# Patient Record
Sex: MALE | Race: WHITE | NOT HISPANIC OR LATINO | Employment: UNEMPLOYED | URBAN - NONMETROPOLITAN AREA
[De-identification: names, ages, dates, MRNs, and addresses within clinical notes are randomized per-mention and may not be internally consistent; named-entity substitution may affect disease eponyms.]

---

## 2019-06-25 ENCOUNTER — OFFICE VISIT (OUTPATIENT)
Dept: PULMONOLOGY | Facility: CLINIC | Age: 39
End: 2019-06-25

## 2019-06-25 VITALS
SYSTOLIC BLOOD PRESSURE: 130 MMHG | OXYGEN SATURATION: 98 % | HEIGHT: 71 IN | BODY MASS INDEX: 33.74 KG/M2 | HEART RATE: 79 BPM | DIASTOLIC BLOOD PRESSURE: 90 MMHG | TEMPERATURE: 98 F | WEIGHT: 241 LBS

## 2019-06-25 DIAGNOSIS — J44.9 CHRONIC OBSTRUCTIVE PULMONARY DISEASE, UNSPECIFIED COPD TYPE (HCC): Primary | ICD-10-CM

## 2019-06-25 LAB
FEV1: 3.59 LITERS
FVC VOL RESPIRATORY: 4.32 LITERS

## 2019-06-25 PROCEDURE — 99202 OFFICE O/P NEW SF 15 MIN: CPT | Performed by: INTERNAL MEDICINE

## 2019-06-25 PROCEDURE — 94010 BREATHING CAPACITY TEST: CPT | Performed by: INTERNAL MEDICINE

## 2019-06-25 ASSESSMENT — PULMONARY FUNCTION TESTS
FVC: 4.32
FEV1: 3.59

## 2019-06-25 NOTE — PROGRESS NOTES
Subjective   No chief complaint on file.      Lan Zheng is a 38 y.o. male     History of Present Illness 38-year-old male referred for evaluation of COPD in spite of his young age she has smoked about 20 years quit 2007 and has recurrent bouts of bronchitis with history of asthma as a child and a father with COPD.  He is  and has 2 kids no one has asthma his wife is a non-smoker and he has been a  currently not working.  He takes Symbicort twice a day and Ventolin inhaler several times a day because of shortness of breath.  Took Trelegy for a while that seem to controlling him better than Symbicort and Ventolin that he is currently taking    Review of Systems recurrent episodes of cough chest infection requiring emergency room visits some shortness of breath on exertion not much wheezing and review of systems noncontributory    Family History   Problem Relation Age of Onset   • Heart failure Father    • Hypertension Father        No past medical history on file.    Past Surgical History:   Procedure Laterality Date   • EYE SURGERY         Social History     Socioeconomic History   • Marital status:      Spouse name: Not on file   • Number of children: Not on file   • Years of education: Not on file   • Highest education level: Not on file   Tobacco Use   • Smoking status: Former Smoker   • Smokeless tobacco: Never Used   Substance and Sexual Activity   • Alcohol use: No     Frequency: Never   • Drug use: No        Physical Exam: No acute distress slightly overweight lungs clear heart regular no clubbing cyanosis or edema    PFT: FVC 99 FEV1 101%    Imaging: Chest x-ray portable from [facility] dated March 17 clear no cardiomegaly    Other Labs:       ASSESSMENT COPD question of asthmatic component        Recommendations: Continue Symbicort and albuterol inhaler that Incruse or drug Spiriva if they deny Trelegy that was prescribed again.  The prescription for Z-Edwin to take his in case  of recurrent bronchitis    Follow up: Return in 4 weeks with a PFT in case of variation and PFT and may singular to his right

## 2019-08-07 ENCOUNTER — OFFICE VISIT (OUTPATIENT)
Dept: PULMONOLOGY | Facility: CLINIC | Age: 39
End: 2019-08-07

## 2019-08-07 VITALS
SYSTOLIC BLOOD PRESSURE: 148 MMHG | TEMPERATURE: 98 F | WEIGHT: 244 LBS | OXYGEN SATURATION: 98 % | HEART RATE: 79 BPM | BODY MASS INDEX: 34.16 KG/M2 | HEIGHT: 71 IN | DIASTOLIC BLOOD PRESSURE: 91 MMHG

## 2019-08-07 DIAGNOSIS — J44.9 CHRONIC OBSTRUCTIVE PULMONARY DISEASE, UNSPECIFIED COPD TYPE (HCC): Primary | ICD-10-CM

## 2019-08-07 LAB
FEV1: 1.77 LITERS
FVC VOL RESPIRATORY: 4.35 LITERS

## 2019-08-07 PROCEDURE — 99212 OFFICE O/P EST SF 10 MIN: CPT | Performed by: INTERNAL MEDICINE

## 2019-08-07 PROCEDURE — 94010 BREATHING CAPACITY TEST: CPT | Performed by: INTERNAL MEDICINE

## 2019-08-07 ASSESSMENT — PULMONARY FUNCTION TESTS
FEV1: 1.77
FVC: 4.35

## 2019-08-07 NOTE — PROGRESS NOTES
Subjective   Chief Complaint   Patient presents with   • COPD       Lan Zheng is a 39 y.o. male     History of Present Illness 39-year-old gentleman seen 6/25/2019 for what seemed to be mild COPD with possible asthmatic component his history was significant for smoking up to 1/2 packs a day for 20 years however he quit 10 years ago his PFT was normal however was given Incruse singular Symbicort and albuterol inhaler he comes in feeling much better since last visit states that he has good days and bad days    Review of Systems minimal cough some shortness of breath occasional wheezing with overall feeling better since last visit    Family History   Problem Relation Age of Onset   • Heart failure Father    • Hypertension Father        History reviewed. No pertinent past medical history.    Past Surgical History:   Procedure Laterality Date   • EYE SURGERY         Social History     Socioeconomic History   • Marital status:      Spouse name: Not on file   • Number of children: Not on file   • Years of education: Not on file   • Highest education level: Not on file   Tobacco Use   • Smoking status: Former Smoker   • Smokeless tobacco: Never Used   Substance and Sexual Activity   • Alcohol use: No     Frequency: Never   • Drug use: No        Physical Exam: No acute distress vital signs are stable lungs are clear heart regular    PFT: PFT much worse today FVC of 84 FEV1 43% is 199 and 100% June 26    Imaging:    Other Labs:       ASSESSMENT COPD with asthmatic component        Recommendations: Continue current medications use albuterol inhaler for rescue    Follow up: Return in 3 months that is November 12 with a PFT or earlier as needed

## 2019-11-12 ENCOUNTER — OFFICE VISIT (OUTPATIENT)
Dept: PULMONOLOGY | Facility: CLINIC | Age: 39
End: 2019-11-12

## 2019-11-12 VITALS
WEIGHT: 244 LBS | DIASTOLIC BLOOD PRESSURE: 93 MMHG | HEART RATE: 75 BPM | HEIGHT: 71 IN | TEMPERATURE: 98 F | SYSTOLIC BLOOD PRESSURE: 162 MMHG | BODY MASS INDEX: 34.16 KG/M2 | OXYGEN SATURATION: 98 %

## 2019-11-12 DIAGNOSIS — J44.9 CHRONIC OBSTRUCTIVE PULMONARY DISEASE, UNSPECIFIED COPD TYPE (HCC): Primary | ICD-10-CM

## 2019-11-12 PROCEDURE — 94010 BREATHING CAPACITY TEST: CPT | Performed by: INTERNAL MEDICINE

## 2019-11-12 PROCEDURE — 99212 OFFICE O/P EST SF 10 MIN: CPT | Performed by: INTERNAL MEDICINE

## 2019-12-10 ENCOUNTER — OFFICE VISIT (OUTPATIENT)
Dept: PULMONOLOGY | Facility: CLINIC | Age: 39
End: 2019-12-10

## 2019-12-10 VITALS
OXYGEN SATURATION: 96 % | BODY MASS INDEX: 35 KG/M2 | DIASTOLIC BLOOD PRESSURE: 85 MMHG | SYSTOLIC BLOOD PRESSURE: 159 MMHG | TEMPERATURE: 98 F | HEART RATE: 85 BPM | WEIGHT: 250 LBS | HEIGHT: 71 IN

## 2019-12-10 DIAGNOSIS — J44.9 CHRONIC OBSTRUCTIVE PULMONARY DISEASE, UNSPECIFIED COPD TYPE (HCC): Primary | ICD-10-CM

## 2019-12-10 PROCEDURE — 94010 BREATHING CAPACITY TEST: CPT | Performed by: INTERNAL MEDICINE

## 2019-12-10 PROCEDURE — 99212 OFFICE O/P EST SF 10 MIN: CPT | Performed by: INTERNAL MEDICINE

## 2019-12-10 RX ORDER — IPRATROPIUM BROMIDE AND ALBUTEROL SULFATE 2.5; .5 MG/3ML; MG/3ML
SOLUTION RESPIRATORY (INHALATION)
COMMUNITY
Start: 2019-10-24 | End: 2020-11-25 | Stop reason: SDUPTHER

## 2019-12-10 RX ORDER — THEOPHYLLINE 300 MG/1
TABLET, EXTENDED RELEASE ORAL
COMMUNITY
Start: 2019-11-12 | End: 2020-02-27 | Stop reason: SDUPTHER

## 2019-12-10 RX ORDER — MONTELUKAST SODIUM 10 MG/1
TABLET ORAL
COMMUNITY
Start: 2019-11-21 | End: 2020-06-23

## 2019-12-10 RX ORDER — LORATADINE 10 MG/1
TABLET ORAL
COMMUNITY
Start: 2019-11-21 | End: 2021-11-02 | Stop reason: SDUPTHER

## 2019-12-10 RX ORDER — PREDNISONE 10 MG/1
TABLET ORAL
COMMUNITY
Start: 2019-11-12 | End: 2020-06-23

## 2019-12-10 RX ORDER — AZITHROMYCIN 250 MG/1
TABLET, FILM COATED ORAL
COMMUNITY
Start: 2019-11-27 | End: 2020-11-25

## 2019-12-10 NOTE — PROGRESS NOTES
Subjective   Chief Complaint   Patient presents with   • COPD       Lan Zheng is a 39 y.o. male     History of Present Illness 39-year-old male returning for follow-up of COPD with asthmatic component and variable airway obstruction is first office visit with us was June 25 his PFT was normal at that time however follow-up visits his PFT showed this very severe T of obstructive impairment in spite of feeling better his current medications his last office visit with us was December 3 and he was placed on a tapering dose of prednisone and increase his theophylline to 1200mg a day his other medications includes singular Breo incruse and he has been using nebulizer that he lately does not need to use    Review of Systems feeling better less shortness of breath not much cough or expectoration continues to have shortness of breath on exertion and occasional wheezing    Family History   Problem Relation Age of Onset   • Heart failure Father    • Hypertension Father        History reviewed. No pertinent past medical history.    Past Surgical History:   Procedure Laterality Date   • EYE SURGERY         Social History     Socioeconomic History   • Marital status:      Spouse name: Not on file   • Number of children: Not on file   • Years of education: Not on file   • Highest education level: Not on file   Tobacco Use   • Smoking status: Former Smoker   • Smokeless tobacco: Never Used   Substance and Sexual Activity   • Alcohol use: No     Frequency: Never   • Drug use: No        Physical Exam: No acute distress today lungs clear heart regular no clubbing cyanosis or edema    PFT: PFT worse today question of full effort however his FVC 72 FEV1 27% December 3 they were 65 and 35% and first visit of June 25 they were 99 and 101%    Imaging:    Other Labs:       ASSESSMENT COPD with asthmatic component        Recommendations: Renewed all the medications including prednisone 10 mg every other day    Follow up: Return  February 28 with a PFT

## 2020-02-26 ENCOUNTER — OFFICE VISIT (OUTPATIENT)
Dept: PULMONOLOGY | Facility: CLINIC | Age: 40
End: 2020-02-26

## 2020-02-26 VITALS
BODY MASS INDEX: 34.16 KG/M2 | HEART RATE: 100 BPM | OXYGEN SATURATION: 96 % | SYSTOLIC BLOOD PRESSURE: 146 MMHG | WEIGHT: 244 LBS | HEIGHT: 71 IN | TEMPERATURE: 97.8 F | DIASTOLIC BLOOD PRESSURE: 90 MMHG

## 2020-02-26 DIAGNOSIS — J44.9 CHRONIC OBSTRUCTIVE PULMONARY DISEASE, UNSPECIFIED COPD TYPE (HCC): Primary | ICD-10-CM

## 2020-02-26 PROCEDURE — 99212 OFFICE O/P EST SF 10 MIN: CPT | Performed by: INTERNAL MEDICINE

## 2020-02-26 PROCEDURE — 94010 BREATHING CAPACITY TEST: CPT | Performed by: INTERNAL MEDICINE

## 2020-02-26 NOTE — PROGRESS NOTES
Subjective   Chief Complaint   Patient presents with   • COPD       Lan Zheng is a 39 y.o. male     History of Present Illness 39-year-old male with COPD and asthmatic component that has been disabled because of difficulty breathing however currently is doing okay with Incruse and Breo theophylline 600 mg once a day and he rarely needs to use the inhaler he had an episode of bronchitis for which was seen by local MD and was given some antibiotic for 2 weeks.  Had asthma when he was a child and quit smoking 5 years ago    Review of Systems intermittent cough and some expectoration shortness of breath and occasional wheezing    Family History   Problem Relation Age of Onset   • Heart failure Father    • Hypertension Father        History reviewed. No pertinent past medical history.    Past Surgical History:   Procedure Laterality Date   • EYE SURGERY         Social History     Socioeconomic History   • Marital status:      Spouse name: Not on file   • Number of children: Not on file   • Years of education: Not on file   • Highest education level: Not on file   Tobacco Use   • Smoking status: Former Smoker   • Smokeless tobacco: Never Used   Substance and Sexual Activity   • Alcohol use: No     Frequency: Never   • Drug use: No   • Sexual activity: Defer        Physical Exam: Cute distress vital signs are stable minimal expiratory rhonchi heart regular no clubbing cyanosis or edema    PFT: FVC 77 FEV1 65% mild obstructive impairment much improved since December that the numbers were 72 and 27%    Imaging:    Other Labs:       ASSESSMENT COPD with asthmatic component seemingly under control        Recommendations: Not all the medications added a prescription for Z-Edwin to take as needed for acute bronchitis encouraged him to try to work as his PFT seems to be much improved    Follow up: Return in 2 months with PFT    PFT's was recorded has been ordered and interpreted as above

## 2020-02-27 DIAGNOSIS — J44.9 CHRONIC OBSTRUCTIVE PULMONARY DISEASE, UNSPECIFIED COPD TYPE (HCC): Primary | ICD-10-CM

## 2020-02-27 RX ORDER — THEOPHYLLINE 300 MG/1
300 TABLET, EXTENDED RELEASE ORAL DAILY
Qty: 30 TABLET | Refills: 2 | Status: SHIPPED | OUTPATIENT
Start: 2020-02-27 | End: 2020-07-22 | Stop reason: SDUPTHER

## 2020-02-27 RX ORDER — ALBUTEROL SULFATE 90 UG/1
2 AEROSOL, METERED RESPIRATORY (INHALATION) EVERY 4 HOURS PRN
Qty: 18 G | Refills: 11 | Status: SHIPPED | OUTPATIENT
Start: 2020-02-27 | End: 2020-07-22 | Stop reason: SDUPTHER

## 2020-03-04 DIAGNOSIS — J44.9 CHRONIC OBSTRUCTIVE PULMONARY DISEASE, UNSPECIFIED COPD TYPE (HCC): ICD-10-CM

## 2020-06-23 RX ORDER — MONTELUKAST SODIUM 10 MG/1
TABLET ORAL
Qty: 30 TABLET | Refills: 0 | Status: SHIPPED | OUTPATIENT
Start: 2020-06-23 | End: 2020-07-17

## 2020-06-23 RX ORDER — PREDNISONE 10 MG/1
TABLET ORAL
Qty: 15 TABLET | Refills: 0 | Status: SHIPPED | OUTPATIENT
Start: 2020-06-23 | End: 2020-07-17

## 2020-07-17 RX ORDER — MONTELUKAST SODIUM 10 MG/1
TABLET ORAL
Qty: 30 TABLET | Refills: 0 | Status: SHIPPED | OUTPATIENT
Start: 2020-07-17 | End: 2020-07-22 | Stop reason: SDUPTHER

## 2020-07-17 RX ORDER — PREDNISONE 10 MG/1
TABLET ORAL
Qty: 15 TABLET | Refills: 0 | Status: SHIPPED | OUTPATIENT
Start: 2020-07-17 | End: 2020-07-22 | Stop reason: SDUPTHER

## 2020-07-22 ENCOUNTER — OFFICE VISIT (OUTPATIENT)
Dept: PULMONOLOGY | Facility: CLINIC | Age: 40
End: 2020-07-22

## 2020-07-22 DIAGNOSIS — J44.9 CHRONIC OBSTRUCTIVE PULMONARY DISEASE, UNSPECIFIED COPD TYPE (HCC): Primary | ICD-10-CM

## 2020-07-22 DIAGNOSIS — J44.9 CHRONIC OBSTRUCTIVE PULMONARY DISEASE, UNSPECIFIED COPD TYPE (HCC): ICD-10-CM

## 2020-07-22 PROCEDURE — 99441 PR PHYS/QHP TELEPHONE EVALUATION 5-10 MIN: CPT | Performed by: INTERNAL MEDICINE

## 2020-07-22 RX ORDER — MONTELUKAST SODIUM 10 MG/1
10 TABLET ORAL
Qty: 30 TABLET | Refills: 0 | Status: SHIPPED | OUTPATIENT
Start: 2020-07-22 | End: 2021-04-29 | Stop reason: SDUPTHER

## 2020-07-22 RX ORDER — PREDNISONE 10 MG/1
10 TABLET ORAL DAILY
Qty: 30 TABLET | Refills: 5 | Status: SHIPPED | OUTPATIENT
Start: 2020-07-22 | End: 2020-11-25 | Stop reason: SDUPTHER

## 2020-07-22 RX ORDER — DOXYCYCLINE HYCLATE 100 MG/1
CAPSULE ORAL
COMMUNITY
Start: 2020-04-23 | End: 2020-11-25

## 2020-07-22 RX ORDER — FLUTICASONE PROPIONATE 50 MCG
1 SPRAY, SUSPENSION (ML) NASAL DAILY
COMMUNITY
Start: 2020-05-26

## 2020-07-22 RX ORDER — THEOPHYLLINE 300 MG/1
300 TABLET, EXTENDED RELEASE ORAL DAILY
Qty: 30 TABLET | Refills: 2 | Status: SHIPPED | OUTPATIENT
Start: 2020-07-22 | End: 2021-04-29 | Stop reason: SDUPTHER

## 2020-07-22 RX ORDER — PREDNISONE 10 MG/1
10 TABLET ORAL DAILY
Qty: 15 TABLET | Refills: 0 | Status: SHIPPED | OUTPATIENT
Start: 2020-07-22 | End: 2020-11-25 | Stop reason: SDUPTHER

## 2020-07-22 RX ORDER — ALBUTEROL SULFATE 90 UG/1
2 AEROSOL, METERED RESPIRATORY (INHALATION) EVERY 4 HOURS PRN
Qty: 18 G | Refills: 11 | Status: SHIPPED | OUTPATIENT
Start: 2020-07-22 | End: 2020-11-25 | Stop reason: SDUPTHER

## 2020-07-22 RX ORDER — AZITHROMYCIN 250 MG/1
TABLET, FILM COATED ORAL
Qty: 6 TABLET | Refills: 0 | Status: SHIPPED | OUTPATIENT
Start: 2020-07-22 | End: 2020-11-25

## 2020-07-22 RX ORDER — THEOPHYLLINE 600 MG/1
600 TABLET, EXTENDED RELEASE ORAL DAILY
Qty: 30 TABLET | Refills: 5 | Status: SHIPPED | OUTPATIENT
Start: 2020-07-22 | End: 2020-08-21

## 2020-07-22 RX ORDER — AZITHROMYCIN 250 MG/1
TABLET, FILM COATED ORAL
Status: CANCELLED | OUTPATIENT
Start: 2020-07-22

## 2020-07-22 NOTE — PROGRESS NOTES
Subjective   Chief Complaint   Patient presents with   • COPD       Lan Zheng is a 40 y.o. male this is sister gave consent for telephone conversation called him Wednesday morning of July 22 and talk to him on the phone for 10 minutes    History of Present Illness 30-year-old male with history of COPD with asthmatic component last time seen in the office March 4 with history of childhood asthma some smoking until 10 years ago and being disabled because of her breathing problem and taking and Breo Incruse theophylline and prednisone 10 mg every other day and Z-Edwin as needed    Review of Systems daily cough intermittent expectoration shortness of breath and occasional wheezing feels that needs to take more prednisone to control his symptoms    Family History   Problem Relation Age of Onset   • Heart failure Father    • Hypertension Father        History reviewed. No pertinent past medical history.    Past Surgical History:   Procedure Laterality Date   • EYE SURGERY         Social History     Socioeconomic History   • Marital status:      Spouse name: Not on file   • Number of children: Not on file   • Years of education: Not on file   • Highest education level: Not on file   Tobacco Use   • Smoking status: Former Smoker   • Smokeless tobacco: Never Used   Substance and Sexual Activity   • Alcohol use: No     Frequency: Never   • Drug use: No   • Sexual activity: Defer        Physical Exam: No physical exam but did not seem to be any distress on the phone    PFT: PFT in the office March 4 showed FVC of 77 FEV1 65% indicating mild obstructive impairment    Imaging: Chest x-ray the office was clear    Other Labs:       ASSESSMENT COPD with asthmatic component        Recommendations: Current medications may increase prednisone from 10 mg every other day to 20 mg every other day and try to taper instructed in the to use the lowest dose every other day to prevent complications    Follow up: Follow-up office in 3  months with PFT call earlier as needed  Talk to patient on phone for 10 minutes and seemed to understand all instructions

## 2020-11-25 ENCOUNTER — OFFICE VISIT (OUTPATIENT)
Dept: PULMONOLOGY | Facility: CLINIC | Age: 40
End: 2020-11-25

## 2020-11-25 VITALS
TEMPERATURE: 98.6 F | BODY MASS INDEX: 32.2 KG/M2 | SYSTOLIC BLOOD PRESSURE: 140 MMHG | DIASTOLIC BLOOD PRESSURE: 84 MMHG | HEART RATE: 73 BPM | OXYGEN SATURATION: 99 % | HEIGHT: 71 IN | WEIGHT: 230 LBS

## 2020-11-25 DIAGNOSIS — J45.41 MODERATE PERSISTENT ASTHMA WITH ACUTE EXACERBATION: Primary | ICD-10-CM

## 2020-11-25 DIAGNOSIS — J44.9 CHRONIC OBSTRUCTIVE PULMONARY DISEASE, UNSPECIFIED COPD TYPE (HCC): ICD-10-CM

## 2020-11-25 DIAGNOSIS — Z87.891 FORMER SMOKER: ICD-10-CM

## 2020-11-25 PROCEDURE — 99214 OFFICE O/P EST MOD 30 MIN: CPT | Performed by: PHYSICIAN ASSISTANT

## 2020-11-25 RX ORDER — PREDNISONE 10 MG/1
TABLET ORAL
Qty: 31 TABLET | Refills: 0 | Status: SHIPPED | OUTPATIENT
Start: 2020-11-25 | End: 2021-04-29

## 2020-11-25 RX ORDER — BUDESONIDE AND FORMOTEROL FUMARATE DIHYDRATE 160; 4.5 UG/1; UG/1
2 AEROSOL RESPIRATORY (INHALATION) 2 TIMES DAILY
Qty: 1 INHALER | Refills: 5 | Status: SHIPPED | OUTPATIENT
Start: 2020-11-25 | End: 2020-11-25 | Stop reason: SDUPTHER

## 2020-11-25 RX ORDER — IPRATROPIUM BROMIDE AND ALBUTEROL SULFATE 2.5; .5 MG/3ML; MG/3ML
3 SOLUTION RESPIRATORY (INHALATION)
Qty: 360 ML | Refills: 8 | Status: SHIPPED | OUTPATIENT
Start: 2020-11-25 | End: 2021-07-28 | Stop reason: SDUPTHER

## 2020-11-25 RX ORDER — ALBUTEROL SULFATE 90 UG/1
2 AEROSOL, METERED RESPIRATORY (INHALATION) EVERY 4 HOURS PRN
Qty: 18 G | Refills: 11 | Status: SHIPPED | OUTPATIENT
Start: 2020-11-25 | End: 2021-07-28 | Stop reason: SDUPTHER

## 2020-11-25 RX ORDER — PREDNISONE 10 MG/1
20 TABLET ORAL EVERY OTHER DAY
Qty: 30 TABLET | Refills: 5 | Status: SHIPPED | OUTPATIENT
Start: 2020-11-25 | End: 2020-12-25

## 2020-11-25 NOTE — PROGRESS NOTES
Have you had the Influenza Vaccine? yes    Would you like to receive this Vaccine today? no    Have you had the Pneumonia Vaccine?  no  Would you like to receive this Vaccine today? no    Are you a current smoker? no  Quit date? 13 years ago    Subjective    Lan Zheng presents for the following COPD      History of Present Illness   Patient presents today for follow up of COPD with concern of mixed asthmatic component, with history of childhood asthma, and brief cigarette dependence.   He is notable for intermittent but overall frequency of symptoms.  He has required frequent use of oral steroids over time and now requires 20 mg alternating daily for symptom maintenance.  He also requires 300 mg theophylline.  He states that he tried 600 mg daily in the past, but notes chest pain with use and improved with lowering the dose.  He has been on this for an extended period of time.  He currently continues with Breo and Incruse Ellipta at this time, but states that these are somewhat difficult to inhale and leave an oral residual.  He is notable for some acute wheezing today.    Review of Systems   Constitutional: Negative for activity change, fatigue and unexpected weight change.   HENT: Negative for congestion, postnasal drip and rhinorrhea.    Respiratory: Positive for cough, shortness of breath and wheezing. Negative for apnea and chest tightness.    Cardiovascular: Negative for chest pain and palpitations.   Gastrointestinal: Negative for nausea.   Allergic/Immunologic: Negative for environmental allergies.   Psychiatric/Behavioral: Negative for agitation and confusion.       Active Problems:  Problem List Items Addressed This Visit        Other    Former smoker      Other Visit Diagnoses     Moderate persistent asthma with acute exacerbation    -  Primary    Relevant Medications    Fluticasone-Umeclidin-Vilant (Trelegy Ellipta) 200-62.5-25 MCG/INH aerosol powder     albuterol sulfate  (90 Base) MCG/ACT  inhaler    ipratropium-albuterol (DUO-NEB) 0.5-2.5 mg/3 ml nebulizer    Other Relevant Orders    CBC & Differential    IgE Level    Chronic obstructive pulmonary disease, unspecified COPD type (CMS/[facility])        Relevant Medications    Fluticasone-Umeclidin-Vilant (Trelegy Ellipta) 200-62.5-25 MCG/INH aerosol powder     albuterol sulfate  (90 Base) MCG/ACT inhaler    ipratropium-albuterol (DUO-NEB) 0.5-2.5 mg/3 ml nebulizer    predniSONE (DELTASONE) 10 MG tablet    Other Relevant Orders    CBC & Differential    IgE Level          Past Medical History:  History reviewed. No pertinent past medical history.    Family History:  Family History   Problem Relation Age of Onset   • Heart failure Father    • Hypertension Father        Social History:  Social History     Tobacco Use   • Smoking status: Former Smoker   • Smokeless tobacco: Never Used   Substance Use Topics   • Alcohol use: No     Frequency: Never       Current Medications:  Current Outpatient Medications   Medication Sig Dispense Refill   • albuterol sulfate  (90 Base) MCG/ACT inhaler Inhale 2 puffs Every 4 (Four) Hours As Needed for Wheezing. 18 g 11   • fluticasone (FLONASE) 50 MCG/ACT nasal spray      • ipratropium-albuterol (DUO-NEB) 0.5-2.5 mg/3 ml nebulizer Take 3 mL by nebulization 4 (Four) Times a Day. 360 mL 8   • loratadine (CLARITIN) 10 MG tablet      • montelukast (SINGULAIR) 10 MG tablet Take 1 tablet by mouth every night at bedtime. 30 tablet 0   • predniSONE (DELTASONE) 10 MG tablet Take 1 tablet by mouth Daily. 15 tablet 0   • theophylline (THEODUR) 300 MG 12 hr tablet Take 1 tablet by mouth Daily. 30 tablet 2   • Umeclidinium Bromide (Incruse Ellipta) 62.5 MCG/INH aerosol powder  Inhale 1 puff Daily. 30 each 0   • azithromycin (ZITHROMAX) 250 MG tablet      • azithromycin (ZITHROMAX) 250 MG tablet Take 2 by mouth today then 1 daily for 4 days 6 tablet 0   • azithromycin (ZITHROMAX) 250 MG tablet Take 2 by mouth today then 1 daily  "for 4 days 6 tablet 0   • doxycycline (VIBRAMYCIN) 100 MG capsule      • Fluticasone Furoate-Vilanterol (Breo Ellipta) 200-25 MCG/INH inhaler Inhale 1 puff Daily. 1 inhaler 5   • Fluticasone-Umeclidin-Vilant (Trelegy Ellipta) 200-62.5-25 MCG/INH aerosol powder  Inhale 1 puff Daily. 1 each 8   • predniSONE (DELTASONE) 10 MG tablet Take 1 tablet by mouth Daily. 30 tablet 5   • predniSONE (DELTASONE) 10 MG tablet Take 4 tabs daily x 3 days, then take 3 tabs daily x 3 days, then take 2 tabs daily x 3 days, then take 1 tab daily x 3 days 31 tablet 0     No current facility-administered medications for this visit.        Allergies:  No Known Allergies    Vitals:  /84   Pulse 73   Temp 98.6 °F (37 °C) (Temporal)   Ht 180.3 cm (71\")   Wt 104 kg (230 lb)   SpO2 99%   BMI 32.08 kg/m²     Imaging:    Imaging Results (Most Recent)     None          Pulmonary Functions Testing Results:    FEV1   Date Value Ref Range Status   08/07/2019 1.77 liters Final     FVC   Date Value Ref Range Status   08/07/2019 4.35 liters Final       Results for orders placed or performed in visit on 08/07/19   Pulmonary Function Test   Result Value Ref Range    FEV1 1.77 liters    FVC 4.35 liters       Objective   Physical Exam  Vitals signs reviewed.   Constitutional:       General: He is not in acute distress.     Appearance: He is well-developed. He is not diaphoretic.   HENT:      Head: Normocephalic and atraumatic.   Eyes:      Pupils: Pupils are equal, round, and reactive to light.   Neck:      Musculoskeletal: Normal range of motion.      Thyroid: No thyromegaly.   Cardiovascular:      Rate and Rhythm: Normal rate and regular rhythm.      Heart sounds: Normal heart sounds, S1 normal and S2 normal.   Pulmonary:      Effort: Pulmonary effort is normal.      Breath sounds: Wheezing present. No rhonchi or rales.   Musculoskeletal: Normal range of motion.   Skin:     General: Skin is warm and dry.   Neurological:      Mental Status: He " is alert and oriented to person, place, and time.   Psychiatric:         Behavior: Behavior normal.         Assessment/Plan      I have reviewed the past medical history, family history, social history, surgical history, and allergies.     Reviewed the previous spirometry reports.    No previous lab assessment available.       ICD-10-CM ICD-9-CM   1. Moderate persistent asthma with acute exacerbation  J45.41 493.92   2. Chronic obstructive pulmonary disease, unspecified COPD type (CMS/[facility])  J44.9 496   3. Former smoker  Z87.891 V15.82          COPD/asthmatic bronchitis with acute exacerbation:   · Continue use of albuterol inhaler as needed  · Continue DuoNeb treatments as needed.  · Previously on Breo/Incruse Ellipta  · Started on Trelegy Ellipta 200 with a goal of improved symptomatic maintenance.  We will apply for PA as needed.  Samples provided.   · Ordered prednisone taper pack for acute exacerbation today  · On prednisone 20 mg alternating daily  · On theophylline 300 mg daily  · Ordered CBC with differential, IgE  [facility].   · Will consider repeat PFT at the next visit.       Former smoker:   Won't qualify for low dose CT imaging upon turning 56 due to years since cessation.         Vaccinations: Recently received an influenza vaccination.    Patient's Body mass index is 32.08 kg/m². BMI is above normal parameters. Recommendations include: exercise counseling.    Return in about 6 months (around 5/25/2021), or as needed.

## 2021-04-29 ENCOUNTER — OFFICE VISIT (OUTPATIENT)
Dept: PULMONOLOGY | Facility: CLINIC | Age: 41
End: 2021-04-29

## 2021-04-29 VITALS
HEIGHT: 71 IN | DIASTOLIC BLOOD PRESSURE: 88 MMHG | HEART RATE: 86 BPM | BODY MASS INDEX: 32.76 KG/M2 | SYSTOLIC BLOOD PRESSURE: 152 MMHG | WEIGHT: 234 LBS | OXYGEN SATURATION: 98 % | TEMPERATURE: 97.5 F

## 2021-04-29 DIAGNOSIS — J44.9 CHRONIC OBSTRUCTIVE PULMONARY DISEASE, UNSPECIFIED COPD TYPE (HCC): Primary | ICD-10-CM

## 2021-04-29 DIAGNOSIS — R06.00 DYSPNEA, UNSPECIFIED TYPE: ICD-10-CM

## 2021-04-29 DIAGNOSIS — Z87.891 FORMER SMOKER: ICD-10-CM

## 2021-04-29 PROCEDURE — 99214 OFFICE O/P EST MOD 30 MIN: CPT | Performed by: PHYSICIAN ASSISTANT

## 2021-04-29 RX ORDER — THEOPHYLLINE 300 MG/1
300 TABLET, EXTENDED RELEASE ORAL DAILY
Qty: 30 TABLET | Refills: 3 | Status: SHIPPED | OUTPATIENT
Start: 2021-04-29 | End: 2021-08-17

## 2021-04-29 RX ORDER — MONTELUKAST SODIUM 10 MG/1
10 TABLET ORAL
Qty: 30 TABLET | Refills: 6 | Status: SHIPPED | OUTPATIENT
Start: 2021-04-29 | End: 2022-02-02 | Stop reason: SDUPTHER

## 2021-04-29 RX ORDER — PREDNISONE 10 MG/1
10 TABLET ORAL DAILY
COMMUNITY
End: 2021-04-29 | Stop reason: SDUPTHER

## 2021-04-29 RX ORDER — PREDNISONE 10 MG/1
10 TABLET ORAL EVERY OTHER DAY
Qty: 15 TABLET | Refills: 6 | Status: SHIPPED | OUTPATIENT
Start: 2021-04-29 | End: 2021-11-02 | Stop reason: SDUPTHER

## 2021-04-29 NOTE — PROGRESS NOTES
Interval history since last visit: Intermittent symptom exacerbation    Recent hospitalizations: None    Investigations (imaging, PFT's, labs, sleep study, record requests, etc.)    Have you had the Influenza Vaccine? yes    Would you like to receive this Vaccine today? no    Have you had the Pneumonia Vaccine?  no  Would you like to receive this Vaccine today? no    Subjective    Lan Zheng presents for the following Asthma      History of Present Illness     Patient presents today for follow-up of asthmatic COPD and for smoking history.  He states that since previous visit, he again experienced an exacerbation requiring steroid shot and oral steroid tablets.  He continues on current inhaler therapy of Trelegy Ellipta high-dose, but did not seem to have significant improvement with this inhaler change.  He remains on theophylline 300 mg daily.  He notes increasing chest tightness with exacerbations accompanied by wheezing and coughing.  He was unable to complete the previous lab assessment ordered.  He achieved smoking cessation in approximately 2015.  He admits to significant seasonal allergies and also takes Singulair nightly as well as oral antihistamine and Flonase.    Review of Systems   Constitutional: Negative for chills and fever.   HENT: Negative for congestion and rhinorrhea.    Respiratory: Positive for shortness of breath. Negative for cough and wheezing.    Cardiovascular: Negative for chest pain.       Active Problems:  Problem List Items Addressed This Visit     None          Past Medical History:  History reviewed. No pertinent past medical history.    Family History:  Family History   Problem Relation Age of Onset   • Heart failure Father    • Hypertension Father        Social History:  Social History     Tobacco Use   • Smoking status: Former Smoker   • Smokeless tobacco: Never Used   Substance Use Topics   • Alcohol use: No       Current Medications:  Current Outpatient Medications   Medication  "Sig Dispense Refill   • albuterol sulfate  (90 Base) MCG/ACT inhaler Inhale 2 puffs Every 4 (Four) Hours As Needed for Wheezing. 18 g 11   • fluticasone (FLONASE) 50 MCG/ACT nasal spray      • Fluticasone-Umeclidin-Vilant (Trelegy Ellipta) 200-62.5-25 MCG/INH aerosol powder  Inhale 1 puff Daily. 1 each 8   • ipratropium-albuterol (DUO-NEB) 0.5-2.5 mg/3 ml nebulizer Take 3 mL by nebulization 4 (Four) Times a Day. 360 mL 8   • loratadine (CLARITIN) 10 MG tablet      • montelukast (SINGULAIR) 10 MG tablet Take 1 tablet by mouth every night at bedtime. 30 tablet 0   • predniSONE (DELTASONE) 10 MG tablet Take 4 tabs daily x 3 days, then take 3 tabs daily x 3 days, then take 2 tabs daily x 3 days, then take 1 tab daily x 3 days 31 tablet 0   • theophylline (THEODUR) 300 MG 12 hr tablet Take 1 tablet by mouth Daily. 30 tablet 2     No current facility-administered medications for this visit.       Allergies:  No Known Allergies    Vitals:  /88   Pulse 86   Temp 97.5 °F (36.4 °C) (Temporal)   Ht 180.3 cm (71\")   Wt 106 kg (234 lb)   SpO2 98%   BMI 32.64 kg/m²     Imaging:    Imaging Results (Most Recent)     None          Pulmonary Functions Testing Results:    FEV1   Date Value Ref Range Status   08/07/2019 1.77 liters Final     FVC   Date Value Ref Range Status   08/07/2019 4.35 liters Final       Results for orders placed or performed in visit on 08/07/19   Pulmonary Function Test   Result Value Ref Range    FEV1 1.77 liters    FVC 4.35 liters       Objective   Physical Exam  Vitals reviewed.   Constitutional:       General: He is not in acute distress.     Appearance: He is well-developed. He is not diaphoretic.   HENT:      Head: Normocephalic and atraumatic.   Eyes:      Pupils: Pupils are equal, round, and reactive to light.   Neck:      Thyroid: No thyromegaly.   Cardiovascular:      Rate and Rhythm: Normal rate and regular rhythm.      Heart sounds: Normal heart sounds, S1 normal and S2 normal. "   Pulmonary:      Effort: Pulmonary effort is normal.      Breath sounds: No wheezing, rhonchi or rales.   Musculoskeletal:         General: Normal range of motion.   Skin:     General: Skin is warm and dry.   Neurological:      Mental Status: He is alert and oriented to person, place, and time.   Psychiatric:         Behavior: Behavior normal.         Assessment/Plan      I have reviewed the past medical history, family history, social history, surgical history, and allergies.     I reviewed previous spirometry reports.    No previous lab assessment with cardiology, or imaging assessment available.        ICD-10-CM ICD-9-CM   1. Chronic obstructive pulmonary disease, unspecified COPD type (CMS/[facility])  J44.9 496   2. Dyspnea, unspecified type  R06.00 786.09   3. Former smoker  Z87.891 V15.82          COPD/asthmatic bronchitis:   · Continue use of albuterol inhaler as needed  · Continue DuoNeb treatments as needed.  · on Trelegy Ellipta 200  · Ordered prednisone taper pack for acute exacerbation today  · On prednisone 10 mg alternating daily  · On theophylline 300 mg daily  · On Singulair nightly  · Ordered CBC with differential, IgE level  · Ordered theophylline level.  We will aim to discontinue use the next visit  · Ordered pulmonary function test  · Ordered chest x-ray        Former smoker:   · Does not currently qualify for low dose CT imaging due to age.  Cessation achieved in 2015.      As previously followed by Dr. Whaley, I provided him with an updated statement saying that his breathing impairments are persistent at this time, this limiting occupational ability.    Return in about 3 months (around 7/29/2021), or as needed.

## 2021-04-30 DIAGNOSIS — Z01.818 OTHER SPECIFIED PRE-OPERATIVE EXAMINATION: Primary | ICD-10-CM

## 2021-05-27 ENCOUNTER — HOSPITAL ENCOUNTER (OUTPATIENT)
Dept: RESPIRATORY THERAPY | Facility: HOSPITAL | Age: 41
Discharge: HOME | End: 2021-05-27

## 2021-05-27 ENCOUNTER — LAB (OUTPATIENT)
Dept: LAB | Facility: HOSPITAL | Age: 41
End: 2021-05-27

## 2021-05-27 VITALS — HEART RATE: 91 BPM | RESPIRATION RATE: 18 BRPM | OXYGEN SATURATION: 98 %

## 2021-05-27 DIAGNOSIS — Z01.818 OTHER SPECIFIED PRE-OPERATIVE EXAMINATION: ICD-10-CM

## 2021-05-27 DIAGNOSIS — J44.9 CHRONIC OBSTRUCTIVE PULMONARY DISEASE, UNSPECIFIED COPD TYPE (HCC): ICD-10-CM

## 2021-05-27 DIAGNOSIS — R06.00 DYSPNEA, UNSPECIFIED TYPE: ICD-10-CM

## 2021-05-27 LAB — SARS-COV-2 RDRP RESP QL NAA+PROBE: NORMAL

## 2021-05-27 PROCEDURE — 94640 AIRWAY INHALATION TREATMENT: CPT

## 2021-05-27 PROCEDURE — 94726 PLETHYSMOGRAPHY LUNG VOLUMES: CPT

## 2021-05-27 PROCEDURE — 94060 EVALUATION OF WHEEZING: CPT

## 2021-05-27 PROCEDURE — 94060 EVALUATION OF WHEEZING: CPT | Performed by: INTERNAL MEDICINE

## 2021-05-27 PROCEDURE — 94799 UNLISTED PULMONARY SVC/PX: CPT

## 2021-05-27 PROCEDURE — 94729 DIFFUSING CAPACITY: CPT

## 2021-05-27 PROCEDURE — 87635 SARS-COV-2 COVID-19 AMP PRB: CPT

## 2021-05-27 PROCEDURE — 94726 PLETHYSMOGRAPHY LUNG VOLUMES: CPT | Performed by: INTERNAL MEDICINE

## 2021-05-27 PROCEDURE — C9803 HOPD COVID-19 SPEC COLLECT: HCPCS

## 2021-05-27 PROCEDURE — 94729 DIFFUSING CAPACITY: CPT | Performed by: INTERNAL MEDICINE

## 2021-05-27 RX ORDER — ALBUTEROL SULFATE 2.5 MG/3ML
2.5 SOLUTION RESPIRATORY (INHALATION) ONCE
Status: COMPLETED | OUTPATIENT
Start: 2021-05-27 | End: 2021-05-27

## 2021-05-27 RX ADMIN — ALBUTEROL SULFATE 2.5 MG: 2.5 SOLUTION RESPIRATORY (INHALATION) at 12:16

## 2021-06-11 ENCOUNTER — TELEPHONE (OUTPATIENT)
Dept: PULMONOLOGY | Facility: CLINIC | Age: 41
End: 2021-06-11

## 2021-06-11 NOTE — TELEPHONE ENCOUNTER
Updated patient with PFT results.  No true obstruction.  Notable for asthma with significant bronchodilator response of +28 change.  DLCO was normal.      As he is on multiple therapies for asthma currently, encourage completion of the CBC with differential and IgE for possible immunologic therapies, as he would likely have significant benefit from use.

## 2021-07-28 ENCOUNTER — OFFICE VISIT (OUTPATIENT)
Dept: PULMONOLOGY | Facility: CLINIC | Age: 41
End: 2021-07-28

## 2021-07-28 VITALS
TEMPERATURE: 98 F | WEIGHT: 235 LBS | HEIGHT: 71 IN | HEART RATE: 89 BPM | OXYGEN SATURATION: 96 % | BODY MASS INDEX: 32.9 KG/M2 | SYSTOLIC BLOOD PRESSURE: 160 MMHG | DIASTOLIC BLOOD PRESSURE: 102 MMHG

## 2021-07-28 DIAGNOSIS — Z87.891 FORMER SMOKER: ICD-10-CM

## 2021-07-28 DIAGNOSIS — J45.51 SEVERE PERSISTENT ASTHMA WITH ACUTE EXACERBATION: Primary | ICD-10-CM

## 2021-07-28 PROCEDURE — 99214 OFFICE O/P EST MOD 30 MIN: CPT | Performed by: PHYSICIAN ASSISTANT

## 2021-07-28 RX ORDER — IPRATROPIUM BROMIDE AND ALBUTEROL SULFATE 2.5; .5 MG/3ML; MG/3ML
3 SOLUTION RESPIRATORY (INHALATION)
Qty: 360 ML | Refills: 8 | Status: SHIPPED | OUTPATIENT
Start: 2021-07-28 | End: 2022-02-02 | Stop reason: SDUPTHER

## 2021-07-28 RX ORDER — DOXYCYCLINE HYCLATE 100 MG/1
100 CAPSULE ORAL 2 TIMES DAILY
Qty: 10 CAPSULE | Refills: 0 | Status: SHIPPED | OUTPATIENT
Start: 2021-07-28 | End: 2021-08-02

## 2021-07-28 RX ORDER — GUAIFENESIN 1200 MG/1
TABLET, EXTENDED RELEASE ORAL
COMMUNITY
Start: 2021-04-22 | End: 2022-05-26

## 2021-07-28 RX ORDER — ALBUTEROL SULFATE 90 UG/1
2 AEROSOL, METERED RESPIRATORY (INHALATION) EVERY 4 HOURS PRN
Qty: 18 G | Refills: 11 | Status: SHIPPED | OUTPATIENT
Start: 2021-07-28

## 2021-07-28 RX ORDER — PREDNISONE 10 MG/1
TABLET ORAL
Qty: 31 TABLET | Refills: 0 | Status: SHIPPED | OUTPATIENT
Start: 2021-07-28 | End: 2021-11-02

## 2021-07-28 NOTE — PROGRESS NOTES
"Chief Complaint  Asthma    Subjective          Lan Zheng presents to [facility] PULMONARY AND CRITICAL CARE MEDICINE  History of Present Illness    Patient presents today for follow up of asthma and former smoking history.   He noted significant worsening of symptoms following completion of the PFT. He states his breathing was partially improved as compared to baseline the day he completed the assessment. Since the assessment, he has continues to have breathing difficulties. He was evaluated by primary care for asthma exacerbation and given an antibiotic injection, steroid injection, and po steroid taper pack. He has completed the taper pack. He has resumed maintenance oral steroids with 10 mg prednisone every day for a period of time, and now resumed alternating daily 10 mg prednisone. He notices better maintenance of symptoms with 10 mg daily use. Continues with theophylline 300 mg but does not notice a significant change in symptoms with use.   He continues on Trelegy 200 mcg once daily. This will intermittently aggravate symptoms after initial inhalation before symptom improvement is noted. He requires frequent use of rescue medications.   Shortness of breath is noted with minimal exertion, and is limiting to daily activities. He reports increased chest congestion today and significant sputum production. Persistent shortness of breath noted. Slight improvement of congestion notable with use of mucinex recently.       Objective   Vital Signs:   BP (!) 160/102 (BP Location: Left arm, Patient Position: Sitting)   Pulse 89   Temp 98 °F (36.7 °C)   Ht 180.3 cm (71\")   Wt 107 kg (235 lb)   SpO2 96%   BMI 32.78 kg/m²     Physical Exam  Vitals reviewed.   Constitutional:       General: He is not in acute distress.     Appearance: He is well-developed. He is not diaphoretic.   HENT:      Head: Normocephalic and atraumatic.   Neck:      Thyroid: No thyromegaly.   Cardiovascular:      Rate and " Rhythm: Normal rate and regular rhythm.      Heart sounds: Normal heart sounds, S1 normal and S2 normal.   Pulmonary:      Effort: Pulmonary effort is normal.      Breath sounds: Wheezing present. No rhonchi or rales.   Skin:     General: Skin is warm and dry.   Neurological:      Mental Status: He is alert and oriented to person, place, and time.   Psychiatric:         Behavior: Behavior normal.          Result Review :   The following data was reviewed by: Vianey Duran PA-C on 07/28/2021:    Data reviewed: Radiologic studies Recent PFT from May 2021.            PFT from May 2021:     Bronchodilator response +28.         Assessment and Plan    Diagnoses and all orders for this visit:    1. Severe persistent asthma with acute exacerbation (Primary)  -     CBC & Differential; Future  -     albuterol sulfate  (90 Base) MCG/ACT inhaler; Inhale 2 puffs Every 4 (Four) Hours As Needed for Wheezing.  Dispense: 18 g; Refill: 11  -     Fluticasone-Umeclidin-Vilant (Trelegy Ellipta) 200-62.5-25 MCG/INH inhaler; Inhale 1 puff Daily.  Dispense: 1 each; Refill: 8  -     ipratropium-albuterol (DUO-NEB) 0.5-2.5 mg/3 ml nebulizer; Take 3 mL by nebulization 4 (Four) Times a Day.  Dispense: 360 mL; Refill: 8  -     predniSONE (DELTASONE) 10 MG tablet; Take 4 tabs daily x 3 days, then take 3 tabs daily x 3 days, then take 2 tabs daily x 3 days, then take 1 tab daily x 3 days  Dispense: 31 tablet; Refill: 0  -     doxycycline (VIBRAMYCIN) 100 MG capsule; Take 1 capsule by mouth 2 (Two) Times a Day for 5 days.  Dispense: 10 capsule; Refill: 0    2. Former smoker          Severe persistent asthma with acute exacerbation:   · Continue albuterol inhaler as needed  · Continue Duonebs treatments as needed.  · Continue Trelegy Ellipta 200 mcg once daily  · Will switch to trial of Breztri, 2 puffs twice daily.  Discussed if this improved symptoms to contact our office for a prescription.   Intermittently notes worsening of breathing  directly following the powder inhalation, requiring a rescue treatment shortly after use.   This may be reduced with MDI inhaler as compared to powder.   · On prednisone 10 mg alternating daily  · On theophylline 300 mg daily. We will attempt to discontinue after current prescription expires.   · On Singulair nightly  · Ordered prednisone taper pack  · Ordered 5 day course of doxycyline  · Continue mucinex as needed.   · Ordered CBC with differential. Counseled that he may qualify for antieosinophlic therapy, which could improve symptoms and reduce oral steroid usage and their potential side effects associated with long term use.         Former smoker:   · Does not qualify for low dose CT imaging due to age (40 y/o).   Cessation achieved in 2015.        Follow Up   Return in about 3 months (around 10/28/2021), or as needed, for Recheck.  Patient was given instructions and counseling regarding his condition or for health maintenance advice. Please see specific information pulled into the AVS if appropriate.

## 2021-08-17 DIAGNOSIS — J44.9 CHRONIC OBSTRUCTIVE PULMONARY DISEASE, UNSPECIFIED COPD TYPE (HCC): ICD-10-CM

## 2021-08-17 RX ORDER — THEOPHYLLINE 300 MG/1
TABLET, EXTENDED RELEASE ORAL
Qty: 30 TABLET | Refills: 2 | Status: SHIPPED | OUTPATIENT
Start: 2021-08-17 | End: 2021-11-05

## 2021-08-23 ENCOUNTER — TELEPHONE (OUTPATIENT)
Dept: PULMONOLOGY | Facility: CLINIC | Age: 41
End: 2021-08-23

## 2021-08-23 NOTE — TELEPHONE ENCOUNTER
Contacted the patient.   Reminded to complete Cbc with differential as he may have eosinophilic asthma which may be better treated with specific medications.     [facility] is forwarding his recent office notes to our office as he has been treated for asthma exacerbations recently since the previous follow up visit. Also reported ordering a CBC with differential but had not been completed. No previous lab work was available from their office.

## 2021-11-02 ENCOUNTER — LAB (OUTPATIENT)
Dept: LAB | Facility: HOSPITAL | Age: 41
End: 2021-11-02

## 2021-11-02 ENCOUNTER — OFFICE VISIT (OUTPATIENT)
Dept: PULMONOLOGY | Facility: CLINIC | Age: 41
End: 2021-11-02

## 2021-11-02 VITALS
BODY MASS INDEX: 32.9 KG/M2 | SYSTOLIC BLOOD PRESSURE: 130 MMHG | WEIGHT: 235 LBS | OXYGEN SATURATION: 98 % | TEMPERATURE: 97.8 F | HEIGHT: 71 IN | HEART RATE: 91 BPM | DIASTOLIC BLOOD PRESSURE: 100 MMHG

## 2021-11-02 DIAGNOSIS — J45.50 SEVERE PERSISTENT ASTHMA, UNSPECIFIED WHETHER COMPLICATED: Primary | ICD-10-CM

## 2021-11-02 DIAGNOSIS — Z87.891 FORMER SMOKER: ICD-10-CM

## 2021-11-02 DIAGNOSIS — J30.2 SEASONAL ALLERGIC RHINITIS, UNSPECIFIED TRIGGER: ICD-10-CM

## 2021-11-02 DIAGNOSIS — J45.50 SEVERE PERSISTENT ASTHMA, UNSPECIFIED WHETHER COMPLICATED: ICD-10-CM

## 2021-11-02 PROCEDURE — 82785 ASSAY OF IGE: CPT

## 2021-11-02 PROCEDURE — 99214 OFFICE O/P EST MOD 30 MIN: CPT | Performed by: PHYSICIAN ASSISTANT

## 2021-11-02 PROCEDURE — 36415 COLL VENOUS BLD VENIPUNCTURE: CPT

## 2021-11-02 PROCEDURE — 85025 COMPLETE CBC W/AUTO DIFF WBC: CPT

## 2021-11-02 RX ORDER — PREDNISONE 10 MG/1
10 TABLET ORAL DAILY
Qty: 30 TABLET | Refills: 6 | Status: SHIPPED | OUTPATIENT
Start: 2021-11-02 | End: 2022-05-16

## 2021-11-02 RX ORDER — LORATADINE 10 MG/1
10 TABLET ORAL DAILY
Qty: 30 TABLET | Refills: 8 | Status: SHIPPED | OUTPATIENT
Start: 2021-11-02 | End: 2022-07-11

## 2021-11-02 NOTE — PROGRESS NOTES
"Chief Complaint  No chief complaint on file.    Subjective          Lan Zheng presents to [facility] PULMONARY & CRITICAL CARE MEDICINE  History of Present Illness     Patient presents today for follow-up of asthma.  He continues to note significant shortness of breath and persistent worsening symptoms.  He noticed worsening symptoms when decreasing steroids to 10 mg every other day.  He had recently run out of this prescription and is hoping to increase back to 10 mg daily.  He continues on theophylline 300 mg daily, but does not feel that this has added symptom benefit.  He requires frequent extra oral steroids and IM injections.  He recently required a steroid injection last Friday due to worsening of symptoms.  Notes a intermittent wheezing at baseline, with worsening during exacerbations.      Objective   Vital Signs:   /100   Pulse 91   Temp 97.8 °F (36.6 °C)   Ht 180.3 cm (70.98\")   Wt 107 kg (235 lb)   SpO2 98%   BMI 32.79 kg/m²       Physical Exam  Vitals reviewed.   Constitutional:       General: He is not in acute distress.     Appearance: He is well-developed. He is not diaphoretic.   HENT:      Head: Normocephalic and atraumatic.   Neck:      Thyroid: No thyromegaly.   Cardiovascular:      Rate and Rhythm: Normal rate and regular rhythm.      Heart sounds: Normal heart sounds, S1 normal and S2 normal.   Pulmonary:      Effort: Pulmonary effort is normal.      Breath sounds: Wheezing (Intermittent expiratory wheeze noted to the left side.) present. No rhonchi or rales.   Neurological:      Mental Status: He is alert and oriented to person, place, and time.   Psychiatric:         Behavior: Behavior normal.        Result Review :   The following data was reviewed by: Vianey Duran PA-C on 11/02/2021:    No recent labs available.    Data reviewed: PFT May 2021     PFT: Bronchodilator response of +28%         Assessment and Plan    Diagnoses and all orders for this " visit:    1. Severe persistent asthma, unspecified whether complicated (Primary)  -     CBC & Differential; Future  -     IgE Level; Future    2. Seasonal allergic rhinitis, unspecified trigger  -     loratadine (CLARITIN) 10 MG tablet; Take 1 tablet by mouth Daily.  Dispense: 30 tablet; Refill: 8    3. Former smoker    Other orders  -     predniSONE (DELTASONE) 10 MG tablet; Take 1 tablet by mouth Daily.  Dispense: 30 tablet; Refill: 6          Severe persistent asthma:   · Continue albuterol inhaler as needed  · Continue Duonebs treatments as needed.  · Continue Trelegy Ellipta 200 mcg once daily  · Breztri tried at last visit, but did not have any significant benefit.  · Switching back to prednisone 10 mg daily.   Alternating daily doses was less effective.  · On theophylline 300 mg daily.   will attempt to discontinue use after restarting prednisone daily.   · On Singulair nightly  · Continue mucinex tablets as needed.   · Ordered CBC with differential.   · Ordered IgE level  · Counseled that he may qualify for antieosinophlic therapy or anti-IgE therapy, which could improve symptoms and reduce oral steroid usage (their potential side effects associated with long term use).      Allergic rhinitis:  · Claritin as needed  · Singulair nightly       Former smoker:   · Does not qualify for low dose CT imaging due to age (42 y/o).   Cessation achieved in approximately 2007.        Follow Up   Return in about 3 months (around 2/2/2022), or as needed, for Recheck.  Patient was given instructions and counseling regarding his condition or for health maintenance advice. Please see specific information pulled into the AVS if appropriate.

## 2021-11-03 LAB
BASOPHILS # BLD AUTO: 0.09 10*3/MM3 (ref 0–0.2)
BASOPHILS NFR BLD AUTO: 0.9 % (ref 0–1.5)
DEPRECATED RDW RBC AUTO: 41.4 FL (ref 37–54)
EOSINOPHIL # BLD AUTO: 0.72 10*3/MM3 (ref 0–0.4)
EOSINOPHIL NFR BLD AUTO: 7.4 % (ref 0.3–6.2)
ERYTHROCYTE [DISTWIDTH] IN BLOOD BY AUTOMATED COUNT: 12.6 % (ref 12.3–15.4)
HCT VFR BLD AUTO: 48 % (ref 37.5–51)
HGB BLD-MCNC: 16.4 G/DL (ref 13–17.7)
IMM GRANULOCYTES # BLD AUTO: 0.17 10*3/MM3 (ref 0–0.05)
IMM GRANULOCYTES NFR BLD AUTO: 1.7 % (ref 0–0.5)
LYMPHOCYTES # BLD AUTO: 2.34 10*3/MM3 (ref 0.7–3.1)
LYMPHOCYTES NFR BLD AUTO: 24 % (ref 19.6–45.3)
MCH RBC QN AUTO: 30.8 PG (ref 26.6–33)
MCHC RBC AUTO-ENTMCNC: 34.2 G/DL (ref 31.5–35.7)
MCV RBC AUTO: 90.2 FL (ref 79–97)
MONOCYTES # BLD AUTO: 0.71 10*3/MM3 (ref 0.1–0.9)
MONOCYTES NFR BLD AUTO: 7.3 % (ref 5–12)
NEUTROPHILS NFR BLD AUTO: 5.7 10*3/MM3 (ref 1.7–7)
NEUTROPHILS NFR BLD AUTO: 58.7 % (ref 42.7–76)
NRBC BLD AUTO-RTO: 0 /100 WBC (ref 0–0.2)
PLATELET # BLD AUTO: 310 10*3/MM3 (ref 140–450)
PMV BLD AUTO: 10.3 FL (ref 6–12)
RBC # BLD AUTO: 5.32 10*6/MM3 (ref 4.14–5.8)
WBC # BLD AUTO: 9.73 10*3/MM3 (ref 3.4–10.8)

## 2021-11-05 DIAGNOSIS — J44.9 CHRONIC OBSTRUCTIVE PULMONARY DISEASE, UNSPECIFIED COPD TYPE (HCC): ICD-10-CM

## 2021-11-05 RX ORDER — THEOPHYLLINE 300 MG/1
TABLET, EXTENDED RELEASE ORAL
Qty: 30 TABLET | Refills: 0 | Status: SHIPPED | OUTPATIENT
Start: 2021-11-05 | End: 2022-05-26

## 2021-11-06 LAB — IGE SERPL-ACNC: 371 IU/ML (ref 6–495)

## 2021-11-12 ENCOUNTER — TELEPHONE (OUTPATIENT)
Dept: PULMONOLOGY | Facility: CLINIC | Age: 41
End: 2021-11-12

## 2021-11-12 NOTE — TELEPHONE ENCOUNTER
Called with lab results. Left a message and patient returned the call     Patient does qualify for anti-eosinophilic therapy by recent CBC and differential, absolute eosinophil level elevated at 0.72.   He would likely be a great candidate for biologic therapy, and is interested in starting Fasenra.    We will initiate the insurance approval, and update him when this is ready to start.

## 2022-02-02 ENCOUNTER — OFFICE VISIT (OUTPATIENT)
Dept: PULMONOLOGY | Facility: CLINIC | Age: 42
End: 2022-02-02

## 2022-02-02 VITALS
DIASTOLIC BLOOD PRESSURE: 92 MMHG | SYSTOLIC BLOOD PRESSURE: 148 MMHG | HEART RATE: 103 BPM | OXYGEN SATURATION: 98 % | WEIGHT: 235 LBS | HEIGHT: 71 IN | TEMPERATURE: 97.8 F | BODY MASS INDEX: 32.9 KG/M2

## 2022-02-02 DIAGNOSIS — Z87.891 FORMER SMOKER: ICD-10-CM

## 2022-02-02 DIAGNOSIS — J45.51 SEVERE PERSISTENT ASTHMA WITH ACUTE EXACERBATION: Primary | ICD-10-CM

## 2022-02-02 DIAGNOSIS — J30.2 SEASONAL ALLERGIC RHINITIS, UNSPECIFIED TRIGGER: ICD-10-CM

## 2022-02-02 DIAGNOSIS — J82.83 EOSINOPHILIC ASTHMA: ICD-10-CM

## 2022-02-02 PROCEDURE — 99214 OFFICE O/P EST MOD 30 MIN: CPT | Performed by: PHYSICIAN ASSISTANT

## 2022-02-02 RX ORDER — PREDNISONE 10 MG/1
TABLET ORAL
Qty: 31 TABLET | Refills: 0 | Status: SHIPPED | OUTPATIENT
Start: 2022-02-02 | End: 2022-05-16 | Stop reason: SDUPTHER

## 2022-02-02 RX ORDER — IPRATROPIUM BROMIDE AND ALBUTEROL SULFATE 2.5; .5 MG/3ML; MG/3ML
3 SOLUTION RESPIRATORY (INHALATION)
Qty: 360 ML | Refills: 8 | Status: SHIPPED | OUTPATIENT
Start: 2022-02-02 | End: 2022-09-09 | Stop reason: SDUPTHER

## 2022-02-02 RX ORDER — MONTELUKAST SODIUM 10 MG/1
10 TABLET ORAL
Qty: 30 TABLET | Refills: 8 | Status: SHIPPED | OUTPATIENT
Start: 2022-02-02 | End: 2023-01-25 | Stop reason: SDUPTHER

## 2022-02-02 NOTE — PROGRESS NOTES
"Chief Complaint  Asthma    Subjective          Lan Zheng presents to [facility] PULMONARY & CRITICAL CARE MEDICINE  History of Present Illness    Patient presents today for follow-up of asthma.  He continues to note severe overall persistent symptoms despite inhaler usage and previously he was able to discontinue theophylline after starting prednisone 10 mg daily.  Theophylline.  Discussed recent lab work that was completed, which qualifies him for antieosinophilic therapy.  He is interested in initiating therapy as he also requires frequent steroid usage.  Notable for former smoking history, cessation achieved in 2007.      Objective   Vital Signs:   /92   Pulse 103   Temp 97.8 °F (36.6 °C) (Temporal)   Ht 180.3 cm (71\")   Wt 107 kg (235 lb)   SpO2 98%   BMI 32.78 kg/m²         Physical Exam  Vitals reviewed.   Constitutional:       General: He is not in acute distress.     Appearance: He is well-developed. He is not diaphoretic.   HENT:      Head: Normocephalic and atraumatic.   Neck:      Thyroid: No thyromegaly.   Cardiovascular:      Rate and Rhythm: Normal rate and regular rhythm.      Heart sounds: Normal heart sounds, S1 normal and S2 normal.   Pulmonary:      Effort: Pulmonary effort is normal.      Breath sounds: No wheezing, rhonchi or rales.   Neurological:      Mental Status: He is alert and oriented to person, place, and time.   Psychiatric:         Behavior: Behavior normal.          Result Review :   The following data was reviewed by: Vianey Duran PA-C on 02/02/2022:  CBC w/diff    CBC w/Diff 11/2/21   WBC 9.73   RBC 5.32   Hemoglobin 16.4   Hematocrit 48.0   MCV 90.2   MCH 30.8   MCHC 34.2   RDW 12.6   Platelets 310   Neutrophil Rel % 58.7   Immature Granulocyte Rel % 1.7 (A)   Lymphocyte Rel % 24.0   Monocyte Rel % 7.3   Eosinophil Rel % 7.4 (A)   Basophil Rel % 0.9   (A) Abnormal value          Absolute eosinophil level elevated at 0.72      Reviewed previous " PFT from May 2021          Assessment and Plan    Diagnoses and all orders for this visit:    1. Severe persistent asthma with acute exacerbation (Primary)  -     ipratropium-albuterol (DUO-NEB) 0.5-2.5 mg/3 ml nebulizer; Take 3 mL by nebulization 4 (Four) Times a Day.  Dispense: 360 mL; Refill: 8    2. Eosinophilic asthma    3. Seasonal allergic rhinitis, unspecified trigger    4. Former smoker    Other orders  -     predniSONE (DELTASONE) 10 MG tablet; Take 4 tabs daily x 3 days, then take 3 tabs daily x 3 days, then take 2 tabs daily x 3 days, then take 1 tab daily x 3 days  Dispense: 31 tablet; Refill: 0  -     montelukast (SINGULAIR) 10 MG tablet; Take 1 tablet by mouth every night at bedtime.  Dispense: 30 tablet; Refill: 8        Severe persistent asthma, eosinophilic asthma  · Continue albuterol inhaler as needed  · Continue Duonebs treatments as needed.  · Continue Trelegy Ellipta 200 mcg once daily  · prednisone 10 mg daily.   Alternating daily doses was less effective.  · Theophylline has been discontinued completely.  · On Singulair nightly  · Continue mucinex tablets as needed.   · Qualifies for antieosinophlic therapy or anti-IgE therapy, which could improve symptoms and reduce oral steroid usage (their potential side effects associated with long term use).   Contacting [facility] pharmacy to initiate Fasenra.    · Ordered oral steroid taper due to acute worsening of symptoms.   (hold maintenance status)     Previous inhaler failure: Breztri       Allergic rhinitis:  · Continue Claritin as needed  · Continue Singulair nightly        Former smoker:   · Does not qualify for low dose CT imaging due to age (42 y/o).   Cessation achieved in approximately 2007      Follow Up   Return in about 4 months (around 6/2/2022), or As needed.  Patient was given instructions and counseling regarding his condition or for health maintenance advice. Please see specific information pulled into the AVS if appropriate.

## 2022-02-09 ENCOUNTER — SPECIALTY PHARMACY (OUTPATIENT)
Dept: PHARMACY | Facility: HOSPITAL | Age: 42
End: 2022-02-09

## 2022-02-09 DIAGNOSIS — J45.909 ASTHMA, UNSPECIFIED ASTHMA SEVERITY, UNSPECIFIED WHETHER COMPLICATED, UNSPECIFIED WHETHER PERSISTENT: Primary | ICD-10-CM

## 2022-02-09 RX ORDER — BENRALIZUMAB 30 MG/ML
30 INJECTION, SOLUTION SUBCUTANEOUS
Qty: 1 ML | Refills: 2 | Status: SHIPPED | OUTPATIENT
Start: 2022-02-09 | End: 2022-07-22 | Stop reason: SDUPTHER

## 2022-02-09 NOTE — PROGRESS NOTES
Specialty Pharmacy Patient Management Program  Initial Assessment       Lan Zheng is a 41 y.o. male with asthma seen by a provider and enrolled in the Patient Management program offered by [facility].  An initial outreach was conducted, including assessment of therapy appropriateness and specialty medication education for Fasenra. The patient was introduced to services offered by [facility], including: regular assessments, refill coordination, curbside pick-up or mail order delivery options, prior authorization maintenance, and financial assistance programs as applicable. The patient was also provided with contact information for the pharmacy team.     Relevant Past Medical History and Comorbidities  Relevant medical history and concomitant health conditions were discussed with the patient. The patient's chart has been reviewed for relevant past medical history and comorbid health conditions and updated as necessary.   Past Medical History:   Diagnosis Date   • Asthma    • COPD (chronic obstructive pulmonary disease) (HCC)      Social History     Socioeconomic History   • Marital status:    Tobacco Use   • Smoking status: Former Smoker     Packs/day: 3.00     Types: Cigarettes     Quit date: 10/2007     Years since quittin.3   • Smokeless tobacco: Never Used   Vaping Use   • Vaping Use: Never used   Substance and Sexual Activity   • Alcohol use: No   • Drug use: No   • Sexual activity: Defer       Allergies  Known allergies and reactions were discussed with the patient. The patient's chart has been reviewed for allergy information and updated as necessary.   Patient has no known allergies.    Current Medication List  This medication list has been reviewed with the patient and evaluated for any interactions or necessary modifications/recommendations, and updated to include all prescription medications, OTC medications, and supplements the patient is  currently taking.  This list reflects what is contained in the patient's profile, which has also been marked as reviewed to communicate to other providers it is the most up to date version of the patient's current medication therapy.   Prior to Admission medications    Medication Sig Start Date End Date Taking? Authorizing Provider   albuterol sulfate  (90 Base) MCG/ACT inhaler Inhale 2 puffs Every 4 (Four) Hours As Needed for Wheezing. 7/28/21   Vianey Duran PA-C   Benralizumab (Fasenra) 30 MG/ML solution prefilled syringe Inject 1 mL under the skin into the appropriate area as directed Every 28 (Twenty-Eight) Days. 2/9/22   Vianey Duran PA-C   fluticasone (FLONASE) 50 MCG/ACT nasal spray  5/26/20   ProviderJennifer MD   Fluticasone-Umeclidin-Vilant (Trelegy Ellipta) 200-62.5-25 MCG/INH inhaler Inhale 1 puff Daily. 7/28/21   Vianey Duran PA-C   guaiFENesin ER (Mucinex Maximum Strength) 1200 MG tablet sustained-release 12 hour Take one tablet by oral route every 12 hours as needed for cough/congestion 4/22/21   Provider, MD Jennifer   ipratropium-albuterol (DUO-NEB) 0.5-2.5 mg/3 ml nebulizer Take 3 mL by nebulization 4 (Four) Times a Day. 2/2/22   Vianey Duran PA-C   loratadine (CLARITIN) 10 MG tablet Take 1 tablet by mouth Daily. 11/2/21   Vianey Duran PA-C   montelukast (SINGULAIR) 10 MG tablet Take 1 tablet by mouth every night at bedtime. 2/2/22   Vianey Duran PA-C   predniSONE (DELTASONE) 10 MG tablet Take 1 tablet by mouth Daily. 11/2/21   Vianey Duran PA-C   predniSONE (DELTASONE) 10 MG tablet Take 4 tabs daily x 3 days, then take 3 tabs daily x 3 days, then take 2 tabs daily x 3 days, then take 1 tab daily x 3 days 2/2/22   Vianey Duran PA-C   theophylline (THEODUR) 300 MG 12 hr tablet 1 TABLET BY MOUTH EVERY DAY 11/5/21   Vianey Duran PA-C   ipratropium-albuterol (DUO-NEB) 0.5-2.5 mg/3 ml nebulizer Take 3 mL by  nebulization 4 (Four) Times a Day. 7/28/21 2/2/22  Vianey Duran PA-C   montelukast (SINGULAIR) 10 MG tablet Take 1 tablet by mouth every night at bedtime. 4/29/21 2/2/22  Vianey Duran PA-C       Drug Interactions  Reviewed concomitant medications, allergies, labs, comorbidities/medical history, quality of life, and immunization history.   Drug-drug interactions noted: no significant drug interactions noted.   Advised patient to call the clinic if any new medications are started so we can assess for drug-drug interactions     Relevant Laboratory Values  No results found for: GLUCOSE, CALCIUM, NA, K, CO2, CL, BUN, CREATININE, EGFRIFAFRI, EGFRIFNONA, BCR, ANIONGAP  Lab Results   Component Value Date    WBC 9.73 11/02/2021    RBC 5.32 11/02/2021    HGB 16.4 11/02/2021    HCT 48.0 11/02/2021    MCV 90.2 11/02/2021    MCH 30.8 11/02/2021    MCHC 34.2 11/02/2021    RDW 12.6 11/02/2021    RDWSD 41.4 11/02/2021    MPV 10.3 11/02/2021     11/02/2021    NEUTRORELPCT 58.7 11/02/2021    LYMPHORELPCT 24.0 11/02/2021    MONORELPCT 7.3 11/02/2021    EOSRELPCT 7.4 (H) 11/02/2021    BASORELPCT 0.9 11/02/2021    AUTOIGPER 1.7 (H) 11/02/2021    NEUTROABS 5.70 11/02/2021    LYMPHSABS 2.34 11/02/2021    MONOSABS 0.71 11/02/2021    EOSABS 0.72 (H) 11/02/2021    BASOSABS 0.09 11/02/2021    AUTOIGNUM 0.17 (H) 11/02/2021    NRBC 0.0 11/02/2021       Initial Education Provided for Specialty Medication  The patient has been provided with the following education. All questions and concerns have been addressed prior to the patient receiving the medication, and the patient has verbalized understanding of the education and any materials provided.  Additional patient education shall be provided and documented upon request by the patient, provider or payer.      Provided patient with:   Education sheets about the medication    Initial Teaching Comments   Safe handling, Storage, and Disposal   Contraindications/Safety  Precautions Reviewed with patient   Storage instructions (away from children; away from heat/cold, sunlight, or moisture), handling - use of gloves (caregivers), washing hands after touching pills, managing waste -Storage: Refrigeration, but will be sent directly to clinic  -Discussed safe handling and what to do with any unused medication.   Proper Use, Administration, Missed dose management    patient and/or caregiver on how to take medication, take with/without food, assess their adherence potential, stress importance of adherence, ways to manage adherence (pill boxes, phone reminders, calendars), what to do if miss a dose -Dose/Frequency: 30 mg SubQ every 28 days x3, then every 56 days  -Administration: SubQ at the office  -Expected duration of therapy: indefinite  -Patient is likely to have good treatment adherence;  reinforced the importance of adherence.   -Reviewed how to address missed doses and to let us know of any missed doses.     Side Effects/Adverse Reactions    patient on potential side effects, s/s, ways to manage and prevent, when to call MD/seek help -Discussed with patient   Miscellaneous   Expected/possible outcomes -Discussed with patient    Associated vaccinations -Recommended routine vaccinations   Food interactions, DDIs, financial issues -Drug-drug interactions: None significant noted    -Reminded the patient to let us know before making any changes or starting any new prescription or OTC medications so we can first assess drug interactions.  -Drug-food interactions: None significant noted  -Financial issues: None, $0 copay          Adherence and Self-Administration  • Barriers to Patient Adherence and/or Self-Administration: None identified   • Methods for Supporting Patient Adherence and/or Self-Administration: Patient said he would remember appointments to get injections and that office would also do reminder calls.   • Expected duration of therapy: indefinite    Goals of  Therapy  • Patient Goals of Therapy:   o Consistently take medications as prescribed  o Patient will adhere to medication regimen  o Patient will report any medication side effects to healthcare provider  • Clinical Goals:   o Support patient understanding of medication regimen  o Ensure patient knows the pharmacy contact information  o Schedule regular follow-up to monitor the treatment serious adverse events  o Schedule regular follow-up to confirm medication adherence  o Schedule regular follow-up to monitor disease progression or stabilty    Quality of Life Assessment   The patient's current (pre-therapy) quality of life was discussed with the patient. The QOL segment of this outreach has been reviewed and updated.   • Quality of Life Score: 5    Reassessment Plan & Follow-Up  1. Pharmacist to perform regular reassessments no more than (6) months from the previous assessment.  2. Welcome information and patient satisfaction survey to be sent by retail team with patient's initial fill.  3. Care Coordinator to set up future refill outreaches, coordinate prescription delivery, and escalate clinical questions to pharmacist.     Additional Plans, Therapy Recommendations or Therapy Problems to Be Addressed: None     Attestation  I attest that the initiated specialty medication(s) are appropriate for the patient based on my assessment.  If the prescribed therapy is at any point deemed not appropriate based on the current or future assessments, a consultation will be initiated with the patient's specialty care provider to determine the best course of action. The revised plan of therapy will be documented along with any additional patient education provided.     Roque Chan, PharmD  Clinical Pharmacist  2/9/2022  09:34 EST

## 2022-03-04 ENCOUNTER — CLINICAL SUPPORT (OUTPATIENT)
Dept: PULMONOLOGY | Facility: CLINIC | Age: 42
End: 2022-03-04

## 2022-03-04 ENCOUNTER — SPECIALTY PHARMACY (OUTPATIENT)
Dept: PHARMACY | Facility: HOSPITAL | Age: 42
End: 2022-03-04

## 2022-03-04 DIAGNOSIS — J45.50 SEVERE PERSISTENT ASTHMA WITHOUT COMPLICATION: Primary | ICD-10-CM

## 2022-03-04 PROCEDURE — 96372 THER/PROPH/DIAG INJ SC/IM: CPT | Performed by: PHYSICIAN ASSISTANT

## 2022-03-04 NOTE — PROGRESS NOTES
Specialty Pharmacy Refill Coordination Note      Name:  Lan Zheng  :  1980  Date:  3/4/2022         Past Medical History:   Diagnosis Date   • Asthma    • COPD (chronic obstructive pulmonary disease) (HCC)        Past Surgical History:   Procedure Laterality Date   • EYE SURGERY         Social History     Socioeconomic History   • Marital status:    Tobacco Use   • Smoking status: Former Smoker     Packs/day: 3.00     Types: Cigarettes     Quit date: 10/2007     Years since quittin.4   • Smokeless tobacco: Never Used   Vaping Use   • Vaping Use: Never used   Substance and Sexual Activity   • Alcohol use: No   • Drug use: No   • Sexual activity: Defer       Family History   Problem Relation Age of Onset   • Heart failure Father    • Hypertension Father        No Known Allergies    Current Outpatient Medications   Medication Sig Dispense Refill   • albuterol sulfate  (90 Base) MCG/ACT inhaler Inhale 2 puffs Every 4 (Four) Hours As Needed for Wheezing. 18 g 11   • Benralizumab (Fasenra) 30 MG/ML solution prefilled syringe Inject 1 mL under the skin into the appropriate area as directed Every 28 (Twenty-Eight) Days. 1 mL 2   • fluticasone (FLONASE) 50 MCG/ACT nasal spray      • Fluticasone-Umeclidin-Vilant (Trelegy Ellipta) 200-62.5-25 MCG/INH inhaler Inhale 1 puff Daily. 1 each 8   • guaiFENesin ER (Mucinex Maximum Strength) 1200 MG tablet sustained-release 12 hour Take one tablet by oral route every 12 hours as needed for cough/congestion     • ipratropium-albuterol (DUO-NEB) 0.5-2.5 mg/3 ml nebulizer Take 3 mL by nebulization 4 (Four) Times a Day. 360 mL 8   • loratadine (CLARITIN) 10 MG tablet Take 1 tablet by mouth Daily. 30 tablet 8   • montelukast (SINGULAIR) 10 MG tablet Take 1 tablet by mouth every night at bedtime. 30 tablet 8   • predniSONE (DELTASONE) 10 MG tablet Take 1 tablet by mouth Daily. 30 tablet 6   • predniSONE (DELTASONE) 10 MG tablet Take 4 tabs daily x 3 days,  then take 3 tabs daily x 3 days, then take 2 tabs daily x 3 days, then take 1 tab daily x 3 days 31 tablet 0   • theophylline (THEODUR) 300 MG 12 hr tablet 1 TABLET BY MOUTH EVERY DAY 30 tablet 0     No current facility-administered medications for this visit.         ASSESSMENT/PLAN:      Lan is a 41 y.o. male contacted today regarding refills of Fasenra 30 mg/ml solution prefilled syringe specialty medication(s).    Reviewed and verified with patient:       Specialty medication(s) and dose(s) confirmed: yes    Refill Questions      Most Recent Value   Changes to allergies? No   Changes to medications? No   New conditions since last clinic visit No   Unplanned office visit, urgent care, ED, or hospital admission in the last 4 weeks  No   How does patient/caregiver feel medication is working? Very good   Financial problems or insurance changes  No   If yes, describe changes in insurance or financial issues. None   How many doses of your specialty medications were missed in the last 4 weeks? 0   Why were doses missed? NA   Does this patient require a clinical escalation to a pharmacist? No                     Follow-up: 28 day(s)     Bob Ortiz, Pharmacy Technician  Specialty Pharmacy Technician

## 2022-03-28 ENCOUNTER — SPECIALTY PHARMACY (OUTPATIENT)
Dept: PHARMACY | Facility: HOSPITAL | Age: 42
End: 2022-03-28

## 2022-03-31 ENCOUNTER — CLINICAL SUPPORT (OUTPATIENT)
Dept: PULMONOLOGY | Facility: CLINIC | Age: 42
End: 2022-03-31

## 2022-03-31 DIAGNOSIS — J45.51 SEVERE PERSISTENT ASTHMA WITH ACUTE EXACERBATION: Primary | ICD-10-CM

## 2022-03-31 PROCEDURE — 96372 THER/PROPH/DIAG INJ SC/IM: CPT | Performed by: PHYSICIAN ASSISTANT

## 2022-04-18 DIAGNOSIS — J45.51 SEVERE PERSISTENT ASTHMA WITH ACUTE EXACERBATION: ICD-10-CM

## 2022-05-16 DIAGNOSIS — J45.51 SEVERE PERSISTENT ASTHMA WITH ACUTE EXACERBATION: ICD-10-CM

## 2022-05-16 RX ORDER — PREDNISONE 10 MG/1
10 TABLET ORAL DAILY
Qty: 30 TABLET | Refills: 2 | Status: SHIPPED | OUTPATIENT
Start: 2022-05-16 | End: 2022-09-09 | Stop reason: SDUPTHER

## 2022-05-26 ENCOUNTER — OFFICE VISIT (OUTPATIENT)
Dept: PULMONOLOGY | Facility: CLINIC | Age: 42
End: 2022-05-26

## 2022-05-26 VITALS
BODY MASS INDEX: 32.9 KG/M2 | SYSTOLIC BLOOD PRESSURE: 144 MMHG | HEIGHT: 71 IN | HEART RATE: 97 BPM | TEMPERATURE: 97.3 F | DIASTOLIC BLOOD PRESSURE: 72 MMHG | OXYGEN SATURATION: 99 % | WEIGHT: 235 LBS

## 2022-05-26 DIAGNOSIS — J82.83 EOSINOPHILIC ASTHMA: ICD-10-CM

## 2022-05-26 DIAGNOSIS — J44.9 CHRONIC OBSTRUCTIVE PULMONARY DISEASE, UNSPECIFIED COPD TYPE: Primary | ICD-10-CM

## 2022-05-26 DIAGNOSIS — J30.2 SEASONAL ALLERGIC RHINITIS, UNSPECIFIED TRIGGER: ICD-10-CM

## 2022-05-26 DIAGNOSIS — Z87.891 FORMER SMOKER: ICD-10-CM

## 2022-05-26 PROCEDURE — 96372 THER/PROPH/DIAG INJ SC/IM: CPT | Performed by: PHYSICIAN ASSISTANT

## 2022-05-26 PROCEDURE — 99214 OFFICE O/P EST MOD 30 MIN: CPT | Performed by: PHYSICIAN ASSISTANT

## 2022-05-26 RX ORDER — PREDNISONE 10 MG/1
1 TABLET ORAL DAILY
COMMUNITY
End: 2022-05-26 | Stop reason: SDUPTHER

## 2022-05-26 RX ORDER — GUAIFENESIN 600 MG/1
1200 TABLET, EXTENDED RELEASE ORAL 2 TIMES DAILY PRN
Qty: 120 TABLET | Refills: 3 | Status: SHIPPED | OUTPATIENT
Start: 2022-05-26 | End: 2022-06-25

## 2022-05-26 RX ORDER — METHYLPREDNISOLONE 4 MG/1
1 TABLET ORAL DAILY
Qty: 21 TABLET | Refills: 0 | Status: SHIPPED | OUTPATIENT
Start: 2022-05-26 | End: 2022-07-22

## 2022-07-11 DIAGNOSIS — J30.2 SEASONAL ALLERGIC RHINITIS, UNSPECIFIED TRIGGER: ICD-10-CM

## 2022-07-11 RX ORDER — LORATADINE 10 MG/1
TABLET ORAL
Qty: 30 TABLET | Refills: 0 | Status: SHIPPED | OUTPATIENT
Start: 2022-07-11 | End: 2022-08-08

## 2022-07-22 ENCOUNTER — SPECIALTY PHARMACY (OUTPATIENT)
Dept: PHARMACY | Facility: HOSPITAL | Age: 42
End: 2022-07-22

## 2022-07-22 RX ORDER — IBUPROFEN 200 MG
200 TABLET ORAL EVERY 6 HOURS PRN
COMMUNITY

## 2022-07-22 RX ORDER — BENRALIZUMAB 30 MG/ML
30 INJECTION, SOLUTION SUBCUTANEOUS
Qty: 1 ML | Refills: 3 | Status: SHIPPED | OUTPATIENT
Start: 2022-07-22 | End: 2023-01-11 | Stop reason: SDUPTHER

## 2022-07-22 NOTE — PROGRESS NOTES
[facility]  Asthma Management       Lan Zheng is a 42 y.o. male referred by Pulmonology to the [facility] for severe eosinophilic asthma.  The patient's current asthma regimen includes: Trelegy and Fasenra and Pt reports good adherence to maintenance regimen. Pt reports he is not a smoker or exposed to second hand smoke. Pt does not have a asthma action plan. He reports tolerating Fasenra well with no issues and gets his Fasenra administered by a Pulmonology MA.     Lan Zheng reports asthma kept them from getting as much done msot of the time and having shortness of breath at least once a day.   Pt reports nighttime awakening about 4 nights a week due to asthma symptoms and using a rescue inhaler 2-3 times a week.      Pt self rates asthma control as somewhat controlled.            Past Medical History:   Diagnosis Date   • Asthma    • COPD (chronic obstructive pulmonary disease) (HCC)      Social History     Socioeconomic History   • Marital status:    Tobacco Use   • Smoking status: Former Smoker     Packs/day: 3.00     Types: Cigarettes     Quit date: 10/2007     Years since quittin.8   • Smokeless tobacco: Never Used   Vaping Use   • Vaping Use: Never used   Substance and Sexual Activity   • Alcohol use: No   • Drug use: No   • Sexual activity: Defer     Patient has no known allergies.    Current Outpatient Medications:   •  albuterol sulfate  (90 Base) MCG/ACT inhaler, Inhale 2 puffs Every 4 (Four) Hours As Needed for Wheezing., Disp: 18 g, Rfl: 11  •  Benralizumab (Fasenra) 30 MG/ML solution prefilled syringe, Inject 1 mL under the skin into the appropriate area as directed Every 28 (Twenty-Eight) Days., Disp: 1 mL, Rfl: 2  •  fluticasone (FLONASE) 50 MCG/ACT nasal spray, , Disp: , Rfl:   •  ipratropium-albuterol (DUO-NEB) 0.5-2.5 mg/3 ml nebulizer, Take 3 mL by nebulization 4 (Four) Times a Day., Disp: 360 mL, Rfl: 8  •  loratadine (CLARITIN) 10  MG tablet, 1 TABLET BY MOUTH EVERY DAY, Disp: 30 tablet, Rfl: 0  •  montelukast (SINGULAIR) 10 MG tablet, Take 1 tablet by mouth every night at bedtime., Disp: 30 tablet, Rfl: 8  •  predniSONE (DELTASONE) 10 MG tablet, Take 1 tablet by mouth Daily. Decrease to every other day if tolerated., Disp: 30 tablet, Rfl: 2  •  Trelegy Ellipta 200-62.5-25 MCG/INH inhaler, INHALE 1 PUFF BY MOUTH EVERY DAY, Disp: 60 each, Rfl: 5  No current facility-administered medications for this visit.      Labs:    There were no vitals filed for this visit.  There were no vitals filed for this visit.  No results found for: GLUCOSE, CALCIUM, NA, K, CO2, CL, BUN, CREATININE, EGFRIFAFRI, EGFRIFNONA, BCR, ANIONGAP    Asthma Control Test Results:   0-15 Very Poorly Controlled Asthma   15-20 Poorly Controlled Asthma  20-25 Well Controlled Asthma     Date  7/22/22        ACT Results 11        ACT Results Very Poorly  Controlled Asthma             Vaccination Status   COVID 19: UTD  Influenza: UTD  Pneumococcal: Needs, declines   Zoster: Not indicated     Drug-Drug Interactions:     Drug-Disease Interactions (non-cardioselective beta blockers, NSAIDs): Ibuprofen, recommended Tylenol instead    Patient Assistance: N/A    Inhaler Technique Observed? No  If yes, notes:     Treatment Goals: Risk Reduction and Symptom Control     Medication Assessment & Plan:     Patient will continue Fasenra 30mg Pre-filled syringe SubQ every 4 weeks x 3 doses, then once every 8 weeks for severe eosinophillic asthma.     Advised Pt on the importance of continuing maintenance inhaler regimen and the importance of rinsing mouth after ICS use. This injection does not replace your maintenance inhaler and is not used to treat an asthma attack (use a rescue inhaler).     Recommended discussing an asthma action plan with provider.     Recommended pneumococcal vaccination.     Patient will continue regular follow-up with pulmonology. Patient  will follow-up with specialty  mail out services and pulmonology MA will administer.      Will follow-up in 6 months, or sooner if needed.     Janelle Wang, PharmD  7/22/2022  13:49 EDT

## 2022-08-03 ENCOUNTER — CLINICAL SUPPORT (OUTPATIENT)
Dept: PULMONOLOGY | Facility: CLINIC | Age: 42
End: 2022-08-03

## 2022-08-03 DIAGNOSIS — J45.50 SEVERE PERSISTENT ASTHMA, UNSPECIFIED WHETHER COMPLICATED: Primary | ICD-10-CM

## 2022-08-03 PROCEDURE — 96372 THER/PROPH/DIAG INJ SC/IM: CPT | Performed by: PHYSICIAN ASSISTANT

## 2022-08-08 DIAGNOSIS — J30.2 SEASONAL ALLERGIC RHINITIS, UNSPECIFIED TRIGGER: ICD-10-CM

## 2022-08-08 RX ORDER — LORATADINE 10 MG/1
TABLET ORAL
Qty: 30 TABLET | Refills: 0 | Status: SHIPPED | OUTPATIENT
Start: 2022-08-08 | End: 2022-08-08

## 2022-08-08 RX ORDER — LORATADINE 10 MG/1
10 TABLET ORAL DAILY
Qty: 30 TABLET | Refills: 6 | Status: SHIPPED | OUTPATIENT
Start: 2022-08-08 | End: 2023-01-25 | Stop reason: SDUPTHER

## 2022-09-09 DIAGNOSIS — J45.51 SEVERE PERSISTENT ASTHMA WITH ACUTE EXACERBATION: ICD-10-CM

## 2022-09-09 RX ORDER — PREDNISONE 10 MG/1
10 TABLET ORAL DAILY
Qty: 30 TABLET | Refills: 3 | Status: SHIPPED | OUTPATIENT
Start: 2022-09-09 | End: 2022-11-01

## 2022-09-09 RX ORDER — IPRATROPIUM BROMIDE AND ALBUTEROL SULFATE 2.5; .5 MG/3ML; MG/3ML
3 SOLUTION RESPIRATORY (INHALATION) 4 TIMES DAILY PRN
Qty: 360 ML | Refills: 8 | Status: SHIPPED | OUTPATIENT
Start: 2022-09-09

## 2022-09-13 ENCOUNTER — OFFICE VISIT (OUTPATIENT)
Dept: PULMONOLOGY | Facility: CLINIC | Age: 42
End: 2022-09-13

## 2022-09-13 VITALS
BODY MASS INDEX: 32.47 KG/M2 | DIASTOLIC BLOOD PRESSURE: 112 MMHG | TEMPERATURE: 98.4 F | HEART RATE: 89 BPM | WEIGHT: 245 LBS | SYSTOLIC BLOOD PRESSURE: 162 MMHG | OXYGEN SATURATION: 100 % | HEIGHT: 73 IN

## 2022-09-13 DIAGNOSIS — J30.2 SEASONAL ALLERGIC RHINITIS, UNSPECIFIED TRIGGER: ICD-10-CM

## 2022-09-13 DIAGNOSIS — J45.51 SEVERE PERSISTENT ASTHMA WITH ACUTE EXACERBATION: Primary | ICD-10-CM

## 2022-09-13 DIAGNOSIS — Z87.891 FORMER SMOKER: ICD-10-CM

## 2022-09-13 PROBLEM — J45.909 ASTHMA: Status: ACTIVE | Noted: 2021-12-27

## 2022-09-13 PROCEDURE — 99214 OFFICE O/P EST MOD 30 MIN: CPT | Performed by: PHYSICIAN ASSISTANT

## 2022-09-13 RX ORDER — GUAIFENESIN 600 MG/1
600 TABLET ORAL
COMMUNITY
Start: 2022-09-06

## 2022-09-13 RX ORDER — PREDNISONE 10 MG/1
TABLET ORAL
Qty: 31 TABLET | Refills: 0 | Status: SHIPPED | OUTPATIENT
Start: 2022-09-13 | End: 2023-01-11

## 2022-09-27 ENCOUNTER — SPECIALTY PHARMACY (OUTPATIENT)
Dept: PHARMACY | Facility: HOSPITAL | Age: 42
End: 2022-09-27

## 2022-09-27 NOTE — PROGRESS NOTES
Specialty Pharmacy Refill Coordination Note      Name:  Lan Zheng  :  1980  Date:  2022         Past Medical History:   Diagnosis Date   • Asthma    • COPD (chronic obstructive pulmonary disease) (HCC)        Past Surgical History:   Procedure Laterality Date   • EYE SURGERY         Social History     Socioeconomic History   • Marital status:    Tobacco Use   • Smoking status: Former Smoker     Packs/day: 3.00     Years: 20.00     Pack years: 60.00     Types: Cigarettes     Quit date: 10/2007     Years since quitting: 15.0   • Smokeless tobacco: Never Used   Vaping Use   • Vaping Use: Never used   Substance and Sexual Activity   • Alcohol use: No   • Drug use: No   • Sexual activity: Defer       Family History   Problem Relation Age of Onset   • Heart failure Father    • Hypertension Father        No Known Allergies    Current Outpatient Medications   Medication Sig Dispense Refill   • albuterol sulfate  (90 Base) MCG/ACT inhaler Inhale 2 puffs Every 4 (Four) Hours As Needed for Wheezing. 18 g 11   • Benralizumab (Fasenra) 30 MG/ML solution prefilled syringe Inject 1 mL under the skin into the appropriate area as directed Every 2 (Two) Months. 1 mL 3   • fluticasone (FLONASE) 50 MCG/ACT nasal spray      • ibuprofen (ADVIL,MOTRIN) 200 MG tablet Take 200 mg by mouth Every 6 (Six) Hours As Needed for Mild Pain .     • ipratropium-albuterol (DUO-NEB) 0.5-2.5 mg/3 ml nebulizer Take 3 mL by nebulization 4 (Four) Times a Day As Needed for Wheezing or Shortness of Air. 360 mL 8   • loratadine (CLARITIN) 10 MG tablet Take 1 tablet by mouth Daily. 30 tablet 6   • montelukast (SINGULAIR) 10 MG tablet Take 1 tablet by mouth every night at bedtime. 30 tablet 8   • predniSONE (DELTASONE) 10 MG tablet Take 1 tablet by mouth Daily. Decrease to every other day if tolerated. 30 tablet 3   • predniSONE (DELTASONE) 10 MG tablet Take 4 tabs daily x 3 days, then take 3 tabs daily x 3 days, then take 2  tabs daily x 3 days, then take 1 tab daily x 3 days 31 tablet 0   • SM Mucus Relief 600 MG 12 hr tablet      • Trelegy Ellipta 200-62.5-25 MCG/INH inhaler INHALE 1 PUFF BY MOUTH EVERY DAY 60 each 5     No current facility-administered medications for this visit.         ASSESSMENT/PLAN:      Lan is a 42 y.o. male contacted today regarding refills of  Fasenra 30mg.ml specialty medication(s).    Reviewed and verified with patient:       Specialty medication(s) and dose(s) confirmed: yes    Refill Questions    Flowsheet Row Most Recent Value   Changes to allergies? No   Changes to medications? No   New conditions since last clinic visit No   Unplanned office visit, urgent care, ED, or hospital admission in the last 4 weeks  No   How does patient/caregiver feel medication is working? Very good   Financial problems or insurance changes  No   If yes, describe changes in insurance or financial issues. none   Since the previous refill, were any specialty medication doses or scheduled injections missed or delayed?  No   If yes, please provide the amount 0   Why were doses missed? n/a   Does this patient require a clinical escalation to a pharmacist? No                Medication Adherence    Adherence tools used: directed education  Support network for adherence: healthcare provider          Follow-up: 60 day(s)     Charlotte Hansen, Pharmacy Technician  Specialty Pharmacy Technician

## 2022-10-05 ENCOUNTER — CLINICAL SUPPORT (OUTPATIENT)
Dept: PULMONOLOGY | Facility: CLINIC | Age: 42
End: 2022-10-05

## 2022-10-05 DIAGNOSIS — J45.51 SEVERE PERSISTENT ASTHMA WITH ACUTE EXACERBATION: Primary | ICD-10-CM

## 2022-10-05 PROCEDURE — 96372 THER/PROPH/DIAG INJ SC/IM: CPT | Performed by: PHYSICIAN ASSISTANT

## 2022-10-31 DIAGNOSIS — J45.51 SEVERE PERSISTENT ASTHMA WITH ACUTE EXACERBATION: ICD-10-CM

## 2022-10-31 RX ORDER — FLUTICASONE FUROATE, UMECLIDINIUM BROMIDE AND VILANTEROL TRIFENATATE 200; 62.5; 25 UG/1; UG/1; UG/1
POWDER RESPIRATORY (INHALATION)
Qty: 60 EACH | Refills: 0 | Status: SHIPPED | OUTPATIENT
Start: 2022-10-31 | End: 2022-11-28

## 2022-11-01 PROBLEM — J44.9 CHRONIC OBSTRUCTIVE LUNG DISEASE (HCC): Status: RESOLVED | Noted: 2021-04-29 | Resolved: 2022-11-01

## 2022-11-01 RX ORDER — PREDNISONE 1 MG/1
5 TABLET ORAL SEE ADMIN INSTRUCTIONS
Qty: 45 TABLET | Refills: 6 | Status: SHIPPED | OUTPATIENT
Start: 2022-11-01 | End: 2023-01-25 | Stop reason: SDUPTHER

## 2022-11-28 DIAGNOSIS — J45.51 SEVERE PERSISTENT ASTHMA WITH ACUTE EXACERBATION: ICD-10-CM

## 2022-11-28 RX ORDER — FLUTICASONE FUROATE, UMECLIDINIUM BROMIDE AND VILANTEROL TRIFENATATE 200; 62.5; 25 UG/1; UG/1; UG/1
POWDER RESPIRATORY (INHALATION)
Qty: 60 EACH | Refills: 0 | Status: SHIPPED | OUTPATIENT
Start: 2022-11-28

## 2022-11-30 ENCOUNTER — CLINICAL SUPPORT (OUTPATIENT)
Dept: PULMONOLOGY | Facility: CLINIC | Age: 42
End: 2022-11-30

## 2022-11-30 DIAGNOSIS — J45.50 SEVERE PERSISTENT ASTHMA WITHOUT COMPLICATION: Primary | ICD-10-CM

## 2022-11-30 PROCEDURE — 96372 THER/PROPH/DIAG INJ SC/IM: CPT | Performed by: PHYSICIAN ASSISTANT

## 2022-12-20 NOTE — TELEPHONE ENCOUNTER
----- Message from Ernestina Altamirano MD sent at 12/20/2022  1:12 PM CST -----  This patient would not need to follow-up in occupational medicine at this time unless he has questions.  Reconsider follow-up in occupational medicine upon referral back from Dr. Perze  ----- Message -----  From: Aby Angulo RN  Sent: 12/20/2022   1:06 PM CST  To: Ernestina Altamirano MD       Pt called in requesting refills on Prednisone and Duo Nebs.

## 2023-01-11 ENCOUNTER — SPECIALTY PHARMACY (OUTPATIENT)
Dept: PHARMACY | Facility: HOSPITAL | Age: 43
End: 2023-01-11
Payer: COMMERCIAL

## 2023-01-11 RX ORDER — BENRALIZUMAB 30 MG/ML
30 INJECTION, SOLUTION SUBCUTANEOUS
Qty: 1 ML | Refills: 3 | Status: SHIPPED | OUTPATIENT
Start: 2023-01-11

## 2023-01-11 NOTE — PROGRESS NOTES
[facility]  Asthma Management     Lan Zheng is a 42 y.o. male referred by Vianey Duran  from Pulmonology to the [facility] for severe eosinophilic asthma.  The patient's current asthma regimen includes: Trelegy and Fasenra and Pt reports good adherence to maintenance regimen. Pt reports he is not a smoker or exposed to second hand smoke. He reports tolerating Fasenra well with no issues and gets his Fasenra administered by a Pulmonology MA.     Lan Zheng reports asthma kept them from getting as much done all of the time and having shortness of breath at least once or twice a day.   Pt reports nighttime awakening about 4 nights a week due to asthma symptoms and using a rescue inhaler once a week or less.     Pt self rates asthma control as somewhat controlled.            Past Medical History:   Diagnosis Date   • Asthma    • COPD (chronic obstructive pulmonary disease) ([facility])      Social History     Socioeconomic History   • Marital status:    Tobacco Use   • Smoking status: Former     Packs/day: 3.00     Years: 20.00     Pack years: 60.00     Types: Cigarettes     Quit date: 10/2007     Years since quitting: 15.2   • Smokeless tobacco: Never   Vaping Use   • Vaping Use: Never used   Substance and Sexual Activity   • Alcohol use: No   • Drug use: No   • Sexual activity: Defer     Patient has no known allergies.    Current Outpatient Medications:   •  albuterol sulfate  (90 Base) MCG/ACT inhaler, Inhale 2 puffs Every 4 (Four) Hours As Needed for Wheezing., Disp: 18 g, Rfl: 11  •  Benralizumab (Fasenra) 30 MG/ML solution prefilled syringe, Inject 1 mL under the skin into the appropriate area as directed Every 2 (Two) Months., Disp: 1 mL, Rfl: 3  •  fluticasone (FLONASE) 50 MCG/ACT nasal spray, 1 spray into the nostril(s) as directed by provider Daily., Disp: , Rfl:   •  ibuprofen (ADVIL,MOTRIN) 200 MG tablet, Take 200 mg by mouth Every 6 (Six) Hours As Needed  for Mild Pain ., Disp: , Rfl:   •  ipratropium-albuterol (DUO-NEB) 0.5-2.5 mg/3 ml nebulizer, Take 3 mL by nebulization 4 (Four) Times a Day As Needed for Wheezing or Shortness of Air., Disp: 360 mL, Rfl: 8  •  loratadine (CLARITIN) 10 MG tablet, Take 1 tablet by mouth Daily., Disp: 30 tablet, Rfl: 6  •  montelukast (SINGULAIR) 10 MG tablet, Take 1 tablet by mouth every night at bedtime., Disp: 30 tablet, Rfl: 8  •  predniSONE (DELTASONE) 5 MG tablet, Take 1 tablet by mouth See Admin Instructions. Take 5 mg (1 tablet), then 10 mg (2 tablets) on alternating days for asthma maintenance. (Patient taking differently: Take 10 mg by mouth Daily. Take 5 mg (1 tablet), then 10 mg (2 tablets) on alternating days for asthma maintenance.), Disp: 45 tablet, Rfl: 6  •  SM Mucus Relief 600 MG 12 hr tablet, Take 600 mg by mouth., Disp: , Rfl:   •  Trelegy Ellipta 200-62.5-25 MCG/ACT aerosol powder , INHALE 1 PUFF BY MOUTH EVERY DAY, Disp: 60 each, Rfl: 0  No current facility-administered medications for this visit.      Labs:    There were no vitals filed for this visit.  There were no vitals filed for this visit.  No results found for: GLUCOSE, CALCIUM, NA, K, CO2, CL, BUN, CREATININE, EGFRIFAFRI, EGFRIFNONA, BCR, ANIONGAP    Asthma Control Test Results:   0-15 Very Poorly Controlled Asthma   15-20 Poorly Controlled Asthma  20-25 Well Controlled Asthma     Date  7/22/22 1/11/23       ACT Results 11 14       ACT Results Very Poorly  Controlled Asthma  Very Poorly Controlled Asthma            Vaccination Status   COVID 19: Needs booster, still considering  Influenza: UTD  Pneumococcal: Needs, declines     Drug-Drug Interactions: None with Fasenra    Drug-Disease Interactions (non-cardioselective beta blockers, NSAIDs): Ibuprofen, recommended use only sparingly     Patient Assistance: N/A    Inhaler Technique Observed? No  If yes, notes:     Treatment Goals: Risk Reduction and Symptom Control     Medication Assessment & Plan:      Patient will continue Fasenra 30mg Pre-filled syringe SubQ every 4 weeks x 3 doses, then once every 8 weeks for severe eosinophillic asthma.     Advised Pt on the importance of continuing maintenance inhaler regimen and the importance of rinsing mouth after ICS use. This injection does not replace your maintenance inhaler and is not used to treat an asthma attack (use a rescue inhaler).     Recommended pneumococcal vaccination and bi-valent COVID booster.     Patient will continue regular follow-up with pulmonology. Patient  will follow-up with specialty mail out services and pulmonology MA will administer.      Will follow-up in 6 months, or sooner if needed.     Janelle Wang, PharmD  1/11/2023  14:37 EST

## 2023-01-25 ENCOUNTER — OFFICE VISIT (OUTPATIENT)
Dept: PULMONOLOGY | Facility: CLINIC | Age: 43
End: 2023-01-25
Payer: COMMERCIAL

## 2023-01-25 VITALS
BODY MASS INDEX: 33.6 KG/M2 | HEIGHT: 71 IN | SYSTOLIC BLOOD PRESSURE: 142 MMHG | WEIGHT: 240 LBS | DIASTOLIC BLOOD PRESSURE: 88 MMHG | OXYGEN SATURATION: 97 % | TEMPERATURE: 98.2 F | HEART RATE: 86 BPM

## 2023-01-25 DIAGNOSIS — J45.50 SEVERE PERSISTENT ASTHMA WITHOUT COMPLICATION: Primary | ICD-10-CM

## 2023-01-25 DIAGNOSIS — F17.211 CIGARETTE NICOTINE DEPENDENCE IN REMISSION: ICD-10-CM

## 2023-01-25 DIAGNOSIS — J30.2 SEASONAL ALLERGIC RHINITIS, UNSPECIFIED TRIGGER: ICD-10-CM

## 2023-01-25 PROCEDURE — 99214 OFFICE O/P EST MOD 30 MIN: CPT | Performed by: PHYSICIAN ASSISTANT

## 2023-01-25 PROCEDURE — 96372 THER/PROPH/DIAG INJ SC/IM: CPT | Performed by: PHYSICIAN ASSISTANT

## 2023-01-25 RX ORDER — METHYLPREDNISOLONE 4 MG/1
TABLET ORAL
Qty: 21 TABLET | Refills: 0 | Status: SHIPPED | OUTPATIENT
Start: 2023-01-25

## 2023-01-25 RX ORDER — HYDROCODONE BITARTRATE AND ACETAMINOPHEN 7.5; 325 MG/1; MG/1
TABLET ORAL
COMMUNITY
Start: 2022-12-26

## 2023-01-25 RX ORDER — LORATADINE 10 MG/1
10 TABLET ORAL DAILY
Qty: 30 TABLET | Refills: 6 | Status: SHIPPED | OUTPATIENT
Start: 2023-01-25

## 2023-01-25 RX ORDER — MONTELUKAST SODIUM 10 MG/1
10 TABLET ORAL
Qty: 30 TABLET | Refills: 8 | Status: SHIPPED | OUTPATIENT
Start: 2023-01-25

## 2023-01-25 RX ORDER — ORPHENADRINE CITRATE 100 MG/1
TABLET, EXTENDED RELEASE ORAL
COMMUNITY
Start: 2023-01-24

## 2023-01-25 RX ORDER — PREDNISONE 1 MG/1
5 TABLET ORAL SEE ADMIN INSTRUCTIONS
Qty: 60 TABLET | Refills: 6 | Status: SHIPPED | OUTPATIENT
Start: 2023-01-25

## 2023-01-25 NOTE — PROGRESS NOTES
Subjective      Chief Complaint  Severe persistent asthma with acute exacerbation    Subjective      History of Present Illness  Lan Zheng is a 42 y.o. male who presents today to [facility] for severe persistent asthma. This visit is a follow up appointment.     Severe persistent asthma with acute exacerbation:  Patient reports that over the past week he has been having some occasional shortness of breath and wheezing that is increased from his baseline. He notes more frequent use of his albuterol inhaler and reports quick relief of his symptoms with use. He does report compliance with using his Trelegy Ellipta inhaler as scheduled (1 puff daily). He reports that he has had significant improvement in his breathing since starting the Fasenra injection which he has been on for approximately 1 year (started March 2022). He denies any current adverse reactions such as head ache, sore throat, or or fever. He is also on maintenance prednisone alternating between 10 and 5 mg daily, but feels that taking the 10 mg daily may be beneficial to avoid additional flare-ups. He denies any congestion, cough, fevers, or chills.    He does report that he is still in remission from smoking and states that he has been quit for approximately 10-15 years.       Current Outpatient Medications:   •  albuterol sulfate  (90 Base) MCG/ACT inhaler, Inhale 2 puffs Every 4 (Four) Hours As Needed for Wheezing., Disp: 18 g, Rfl: 11  •  Benralizumab (Fasenra) 30 MG/ML solution prefilled syringe, Inject 1 mL under the skin into the appropriate area as directed Every 2 (Two) Months., Disp: 1 mL, Rfl: 3  •  fluticasone (FLONASE) 50 MCG/ACT nasal spray, 1 spray into the nostril(s) as directed by provider Daily., Disp: , Rfl:   •  HYDROcodone-acetaminophen (NORCO) 7.5-325 MG per tablet, , Disp: , Rfl:   •  ibuprofen (ADVIL,MOTRIN) 200 MG tablet, Take 200 mg by mouth Every 6 (Six) Hours  "As Needed for Mild Pain ., Disp: , Rfl:   •  ipratropium-albuterol (DUO-NEB) 0.5-2.5 mg/3 ml nebulizer, Take 3 mL by nebulization 4 (Four) Times a Day As Needed for Wheezing or Shortness of Air., Disp: 360 mL, Rfl: 8  •  loratadine (CLARITIN) 10 MG tablet, Take 1 tablet by mouth Daily., Disp: 30 tablet, Rfl: 6  •  montelukast (SINGULAIR) 10 MG tablet, Take 1 tablet by mouth every night at bedtime., Disp: 30 tablet, Rfl: 8  •  orphenadrine (NORFLEX) 100 MG 12 hr tablet, , Disp: , Rfl:   •  predniSONE (DELTASONE) 5 MG tablet, Take 1 tablet by mouth See Admin Instructions. Take 10 mg daily; can try to cut down to 10mg/5mg on alternating days when symptoms are stable., Disp: 60 tablet, Rfl: 6  •  SM Mucus Relief 600 MG 12 hr tablet, Take 600 mg by mouth., Disp: , Rfl:   •  Trelegy Ellipta 200-62.5-25 MCG/ACT aerosol powder , INHALE 1 PUFF BY MOUTH EVERY DAY, Disp: 60 each, Rfl: 0  •  methylPREDNISolone (MEDROL) 4 MG dose pack, Take as directed on package instructions., Disp: 21 tablet, Rfl: 0  No current facility-administered medications for this visit.      No Known Allergies    Objective     Objective   Vital Signs:  /88 (BP Location: Left arm, Patient Position: Sitting)   Pulse 86   Temp 98.2 °F (36.8 °C)   Ht 180.3 cm (71\")   Wt 109 kg (240 lb)   SpO2 97%   BMI 33.47 kg/m²   Estimated body mass index is 33.47 kg/m² as calculated from the following:    Height as of this encounter: 180.3 cm (71\").    Weight as of this encounter: 109 kg (240 lb).    Past Medical History:   Diagnosis Date   • Asthma    • Chronic back pain     pinched nerve and slipped disc   • COPD (chronic obstructive pulmonary disease) ([facility])      Past Surgical History:   Procedure Laterality Date   • EYE SURGERY       Social History     Socioeconomic History   • Marital status:    Tobacco Use   • Smoking status: Former     Packs/day: 3.00     Years: 20.00     Pack years: 60.00     Types: Cigarettes     Quit date: 10/2007     Years " since quitting: 15.3   • Smokeless tobacco: Never   Vaping Use   • Vaping Use: Never used   Substance and Sexual Activity   • Alcohol use: No   • Drug use: No   • Sexual activity: Defer        Physical Exam  Constitutional:       Appearance: Normal appearance. He is obese.   HENT:      Head: Normocephalic and atraumatic.      Right Ear: External ear normal.      Left Ear: External ear normal.      Nose: Nose normal.      Mouth/Throat:      Mouth: Mucous membranes are moist.      Pharynx: Oropharynx is clear.   Eyes:      Extraocular Movements: Extraocular movements intact.      Conjunctiva/sclera: Conjunctivae normal.      Pupils: Pupils are equal, round, and reactive to light.   Cardiovascular:      Rate and Rhythm: Normal rate and regular rhythm.      Pulses: Normal pulses.      Heart sounds: Normal heart sounds. No murmur heard.    No friction rub. No gallop.   Pulmonary:      Effort: Pulmonary effort is normal. No respiratory distress.      Breath sounds: Normal breath sounds. No wheezing, rhonchi or rales.      Comments: Currently on room air.   Abdominal:      General: There is no distension.   Musculoskeletal:         General: Normal range of motion.      Cervical back: Normal range of motion and neck supple.   Skin:     General: Skin is warm and dry.   Neurological:      General: No focal deficit present.      Mental Status: He is alert. Mental status is at baseline.   Psychiatric:         Mood and Affect: Mood normal.         Behavior: Behavior normal.       Result Review :  The following labs and radiology results have been reviewed.    Lab Review:   FEV1   Date Value Ref Range Status   08/07/2019 1.77 liters Final     FVC   Date Value Ref Range Status   08/07/2019 4.35 liters Final     No visits with results within 3 Month(s) from this visit.   Latest known visit with results is:   Lab on 11/02/2021   Component Date Value Ref Range Status   • IgE 11/02/2021 371  6 - 495 IU/mL Final   • WBC 11/02/2021 9.73   3.40 - 10.80 10*3/mm3 Final   • RBC 11/02/2021 5.32  4.14 - 5.80 10*6/mm3 Final   • Hemoglobin 11/02/2021 16.4  13.0 - 17.7 g/dL Final   • Hematocrit 11/02/2021 48.0  37.5 - 51.0 % Final   • MCV 11/02/2021 90.2  79.0 - 97.0 fL Final   • MCH 11/02/2021 30.8  26.6 - 33.0 pg Final   • MCHC 11/02/2021 34.2  31.5 - 35.7 g/dL Final   • RDW 11/02/2021 12.6  12.3 - 15.4 % Final   • RDW-SD 11/02/2021 41.4  37.0 - 54.0 fl Final   • MPV 11/02/2021 10.3  6.0 - 12.0 fL Final   • Platelets 11/02/2021 310  140 - 450 10*3/mm3 Final   • Neutrophil % 11/02/2021 58.7  42.7 - 76.0 % Final   • Lymphocyte % 11/02/2021 24.0  19.6 - 45.3 % Final   • Monocyte % 11/02/2021 7.3  5.0 - 12.0 % Final   • Eosinophil % 11/02/2021 7.4 (H)  0.3 - 6.2 % Final   • Basophil % 11/02/2021 0.9  0.0 - 1.5 % Final   • Immature Grans % 11/02/2021 1.7 (H)  0.0 - 0.5 % Final   • Neutrophils, Absolute 11/02/2021 5.70  1.70 - 7.00 10*3/mm3 Final   • Lymphocytes, Absolute 11/02/2021 2.34  0.70 - 3.10 10*3/mm3 Final   • Monocytes, Absolute 11/02/2021 0.71  0.10 - 0.90 10*3/mm3 Final   • Eosinophils, Absolute 11/02/2021 0.72 (H)  0.00 - 0.40 10*3/mm3 Final   • Basophils, Absolute 11/02/2021 0.09  0.00 - 0.20 10*3/mm3 Final   • Immature Grans, Absolute 11/02/2021 0.17 (H)  0.00 - 0.05 10*3/mm3 Final   • nRBC 11/02/2021 0.0  0.0 - 0.2 /100 WBC Final        Reviewed previous PFT from 05/2021  · Significant bronchodilator response noted with +28% changed after use of albuterol.       Assessment / Plan         Assessment     ICD-10-CM ICD-9-CM   1. Severe persistent asthma without complication  J45.50 493.90   2. Seasonal allergic rhinitis, unspecified trigger  J30.2 477.9   3. Cigarette nicotine dependence in remission  F17.211 V15.82       1. Severe Persistent Asthma with suspected mild acute exacerbation  · Continue Albuterol inhaler as needed.   · Continue DuoNeb treatments as needed.   · Continue Trelegy Ellipta 200 mcg 1 puff daily.   · Will order Medrol dose  pack for mild acute exacerbation  Will hold on maintenance prednisone 10mg/5mg; changed administration instructions for patient to take 10 mg when noticing flare up of symptoms, but decrease back to alternating doses when symptoms are stable.   · Received Fasenra injection in office today. No adverse reactions reported.   · Will hold off on ordering repeat PFT at this time.     2. Allergic Rhinitis   · Continue Claritin as needed.   · Continue Singulair nightly.     3. Former tobacco abuse    · Cessation achieved approximately 10-15 years ago.   · Does not qualify for LDCT screening due to age 41 years old.      Follow Up   Return in about 4 months (around 5/25/2023), or if symptoms worsen or fail to improve, for Next scheduled follow up.    Patient was given instructions and counseling regarding his condition or for health maintenance advice. Please see specific information pulled into the AVS if appropriate.       This document has been electronically signed by Hilaria Harris PA-C   January 25, 2023 12:42 EST    Dictated Utilizing Dragon Dictation: Part of this note may be an electronic transcription/translation of spoken language to printed text using the Dragon Dictation System.

## 2023-03-14 ENCOUNTER — SPECIALTY PHARMACY (OUTPATIENT)
Dept: PHARMACY | Facility: HOSPITAL | Age: 43
End: 2023-03-14
Payer: COMMERCIAL

## 2023-03-14 NOTE — PROGRESS NOTES
Specialty Pharmacy Refill Coordination Note      Name:  Lan Zheng  :  1980  Date:  3/14/2023         Past Medical History:   Diagnosis Date   • Asthma    • Chronic back pain     pinched nerve and slipped disc   • COPD (chronic obstructive pulmonary disease) ([facility])        Past Surgical History:   Procedure Laterality Date   • EYE SURGERY         Social History     Socioeconomic History   • Marital status:    Tobacco Use   • Smoking status: Former     Packs/day: 3.00     Years: 20.00     Pack years: 60.00     Types: Cigarettes     Quit date: 10/2007     Years since quitting: 15.4   • Smokeless tobacco: Never   Vaping Use   • Vaping Use: Never used   Substance and Sexual Activity   • Alcohol use: No   • Drug use: No   • Sexual activity: Defer       Family History   Problem Relation Age of Onset   • Heart failure Father    • Hypertension Father        No Known Allergies    Current Outpatient Medications   Medication Sig Dispense Refill   • albuterol sulfate  (90 Base) MCG/ACT inhaler Inhale 2 puffs Every 4 (Four) Hours As Needed for Wheezing. 18 g 11   • Benralizumab (Fasenra) 30 MG/ML solution prefilled syringe Inject 1 mL under the skin into the appropriate area as directed Every 2 (Two) Months. 1 mL 3   • fluticasone (FLONASE) 50 MCG/ACT nasal spray 1 spray into the nostril(s) as directed by provider Daily.     • HYDROcodone-acetaminophen (NORCO) 7.5-325 MG per tablet      • ibuprofen (ADVIL,MOTRIN) 200 MG tablet Take 200 mg by mouth Every 6 (Six) Hours As Needed for Mild Pain .     • ipratropium-albuterol (DUO-NEB) 0.5-2.5 mg/3 ml nebulizer Take 3 mL by nebulization 4 (Four) Times a Day As Needed for Wheezing or Shortness of Air. 360 mL 8   • loratadine (CLARITIN) 10 MG tablet Take 1 tablet by mouth Daily. 30 tablet 6   • methylPREDNISolone (MEDROL) 4 MG dose pack Take as directed on package instructions. 21 tablet 0   • montelukast (SINGULAIR) 10 MG tablet Take 1 tablet by mouth every  night at bedtime. 30 tablet 8   • orphenadrine (NORFLEX) 100 MG 12 hr tablet      • predniSONE (DELTASONE) 5 MG tablet Take 1 tablet by mouth See Admin Instructions. Take 10 mg daily; can try to cut down to 10mg/5mg on alternating days when symptoms are stable. 60 tablet 6   • SM Mucus Relief 600 MG 12 hr tablet Take 600 mg by mouth.     • Trelegy Ellipta 200-62.5-25 MCG/ACT aerosol powder  INHALE 1 PUFF BY MOUTH EVERY DAY 60 each 0     No current facility-administered medications for this visit.         ASSESSMENT/PLAN:      Lan is a 42 y.o. male contacted today regarding refills of  fasenra 30mg/ml injection  specialty medication(s).    Reviewed and verified with patient:       Specialty medication(s) and dose(s) confirmed: yes    Refill Questions    Flowsheet Row Most Recent Value   Changes to allergies? No   Changes to medications? No   New conditions since last clinic visit No   Unplanned office visit, urgent care, ED, or hospital admission in the last 4 weeks  No   How does patient/caregiver feel medication is working? Very good   Financial problems or insurance changes  No   If yes, describe changes in insurance or financial issues. no   Since the previous refill, were any specialty medication doses or scheduled injections missed or delayed?  No   If yes, please provide the amount 0   Why were doses missed? n/a   Does this patient require a clinical escalation to a pharmacist? No                Medication Adherence    Adherence tools used: directed education  Support network for adherence: healthcare provider          Follow-up: 60 day(s)     Keena Vitale, Pharmacy Technician  Specialty Pharmacy Technician

## 2023-03-23 ENCOUNTER — CLINICAL SUPPORT (OUTPATIENT)
Dept: PULMONOLOGY | Facility: CLINIC | Age: 43
End: 2023-03-23
Payer: COMMERCIAL

## 2023-03-23 DIAGNOSIS — J45.50 SEVERE PERSISTENT ASTHMA WITHOUT COMPLICATION: Primary | ICD-10-CM

## 2023-03-23 PROCEDURE — 96372 THER/PROPH/DIAG INJ SC/IM: CPT | Performed by: PHYSICIAN ASSISTANT

## 2023-04-18 DIAGNOSIS — J45.51 SEVERE PERSISTENT ASTHMA WITH ACUTE EXACERBATION: ICD-10-CM

## 2023-04-18 RX ORDER — IPRATROPIUM BROMIDE AND ALBUTEROL SULFATE 2.5; .5 MG/3ML; MG/3ML
3 SOLUTION RESPIRATORY (INHALATION) 4 TIMES DAILY PRN
Qty: 360 ML | Refills: 8 | Status: SHIPPED | OUTPATIENT
Start: 2023-04-18

## 2023-05-09 ENCOUNTER — SPECIALTY PHARMACY (OUTPATIENT)
Dept: PHARMACY | Facility: HOSPITAL | Age: 43
End: 2023-05-09
Payer: COMMERCIAL

## 2023-05-09 NOTE — PROGRESS NOTES
Specialty Pharmacy Refill Coordination Note      Name:  Lan Zheng  :  1980  Date:  2023         Past Medical History:   Diagnosis Date   • Asthma    • Chronic back pain     pinched nerve and slipped disc   • COPD (chronic obstructive pulmonary disease)        Past Surgical History:   Procedure Laterality Date   • EYE SURGERY         Social History     Socioeconomic History   • Marital status:    Tobacco Use   • Smoking status: Former     Packs/day: 3.00     Years: 20.00     Pack years: 60.00     Types: Cigarettes     Quit date: 10/2007     Years since quitting: 15.6   • Smokeless tobacco: Never   Vaping Use   • Vaping Use: Never used   Substance and Sexual Activity   • Alcohol use: No   • Drug use: No   • Sexual activity: Defer       Family History   Problem Relation Age of Onset   • Heart failure Father    • Hypertension Father        No Known Allergies    Current Outpatient Medications   Medication Sig Dispense Refill   • albuterol sulfate  (90 Base) MCG/ACT inhaler Inhale 2 puffs Every 4 (Four) Hours As Needed for Wheezing. 18 g 11   • Benralizumab (Fasenra) 30 MG/ML solution prefilled syringe Inject 1 mL under the skin into the appropriate area as directed Every 2 (Two) Months. 1 mL 3   • fluticasone (FLONASE) 50 MCG/ACT nasal spray 1 spray into the nostril(s) as directed by provider Daily.     • HYDROcodone-acetaminophen (NORCO) 7.5-325 MG per tablet      • ibuprofen (ADVIL,MOTRIN) 200 MG tablet Take 200 mg by mouth Every 6 (Six) Hours As Needed for Mild Pain .     • ipratropium-albuterol (DUO-NEB) 0.5-2.5 mg/3 ml nebulizer Take 3 mL by nebulization 4 (Four) Times a Day As Needed for Wheezing or Shortness of Air. 360 mL 8   • loratadine (CLARITIN) 10 MG tablet Take 1 tablet by mouth Daily. 30 tablet 6   • methylPREDNISolone (MEDROL) 4 MG dose pack Take as directed on package instructions. 21 tablet 0   • montelukast (SINGULAIR) 10 MG tablet Take 1 tablet by mouth every night at  bedtime. 30 tablet 8   • orphenadrine (NORFLEX) 100 MG 12 hr tablet      • predniSONE (DELTASONE) 5 MG tablet Take 1 tablet by mouth See Admin Instructions. Take 10 mg daily; can try to cut down to 10mg/5mg on alternating days when symptoms are stable. 60 tablet 6   • SM Mucus Relief 600 MG 12 hr tablet Take 600 mg by mouth.     • Trelegy Ellipta 200-62.5-25 MCG/ACT aerosol powder  INHALE 1 PUFF BY MOUTH EVERY DAY 60 each 0     Current Facility-Administered Medications   Medication Dose Route Frequency Provider Last Rate Last Admin   • Benralizumab solution prefilled syringe 30 mg  30 mg Subcutaneous Q2 Months Vianey Duran PA-C   30 mg at 03/23/23 1311         ASSESSMENT/PLAN:      Lan is a 42 y.o. male contacted today regarding refills of  Fasenra injection specialty medication(s).    Reviewed and verified with patient:       Specialty medication(s) and dose(s) confirmed: yes    Refill Questions    Flowsheet Row Most Recent Value   Changes to allergies? No   Changes to medications? No   New conditions since last clinic visit No   Unplanned office visit, urgent care, ED, or hospital admission in the last 4 weeks  No   How does patient/caregiver feel medication is working? Very good   Financial problems or insurance changes  No   If yes, describe changes in insurance or financial issues. no   Since the previous refill, were any specialty medication doses or scheduled injections missed or delayed?  No   If yes, please provide the amount na   Why were doses missed? na   Does this patient require a clinical escalation to a pharmacist? No                Medication Adherence    Adherence tools used: directed education  Support network for adherence: healthcare provider          Follow-up: 60 day(s)     Aiyana Mckeon Pharmacy Technician  Specialty Pharmacy Technician

## 2023-05-18 ENCOUNTER — OFFICE VISIT (OUTPATIENT)
Dept: PULMONOLOGY | Facility: CLINIC | Age: 43
End: 2023-05-18
Payer: COMMERCIAL

## 2023-05-18 VITALS
DIASTOLIC BLOOD PRESSURE: 90 MMHG | TEMPERATURE: 98 F | HEIGHT: 71 IN | HEART RATE: 78 BPM | SYSTOLIC BLOOD PRESSURE: 130 MMHG | OXYGEN SATURATION: 98 % | RESPIRATION RATE: 18 BRPM | WEIGHT: 240 LBS | BODY MASS INDEX: 33.6 KG/M2

## 2023-05-18 DIAGNOSIS — J45.50 SEVERE PERSISTENT ASTHMA, UNSPECIFIED WHETHER COMPLICATED: Primary | ICD-10-CM

## 2023-05-18 DIAGNOSIS — Z87.891 FORMER SMOKER: ICD-10-CM

## 2023-05-18 RX ORDER — IPRATROPIUM/ALBUTEROL SULFATE 20-100 MCG
1 MIST INHALER (GRAM) INHALATION EVERY 6 HOURS PRN
Qty: 4 G | Refills: 8 | Status: SHIPPED | OUTPATIENT
Start: 2023-05-18

## 2023-05-18 RX ORDER — IPRATROPIUM BROMIDE AND ALBUTEROL SULFATE 2.5; .5 MG/3ML; MG/3ML
3 SOLUTION RESPIRATORY (INHALATION) 4 TIMES DAILY PRN
Qty: 360 ML | Refills: 8 | Status: SHIPPED | OUTPATIENT
Start: 2023-05-18

## 2023-05-18 RX ORDER — PREDNISONE 5 MG/1
5 TABLET ORAL SEE ADMIN INSTRUCTIONS
Qty: 60 TABLET | Refills: 8 | Status: SHIPPED | OUTPATIENT
Start: 2023-05-18

## 2023-05-18 NOTE — PROGRESS NOTES
"Chief Complaint  Asthma    Subjective        Lan Zheng presents to [facility]IC[facility]  History of Present Illness    Patient presents today for follow up of severe asthma with former smoking history. Symptoms remain significant at times, but overall more maintained than in the past. He continues to a low dose of maintenance prednisone, typically 5-10 mg on alternating days. Occasionally has to use consecutively if symptoms are severe, but sometimes able to have a few day gap as well.   Continues with Trelegy use. Continues with the Fasenra injections and does seem to have a few more better/stable day spans than before.   Still feels that he cannot discontinue the steroids completely yet. Intermittent wheezing noted.  He does achieve appropriate quick relief with duonebs as needed (more so than the albuterol inhaler).       Objective   Vital Signs:  /90   Pulse 78   Temp 98 °F (36.7 °C) (Temporal)   Resp 18   Ht 180.3 cm (71\")   Wt 109 kg (240 lb)   SpO2 98%   BMI 33.47 kg/m²   Estimated body mass index is 33.47 kg/m² as calculated from the following:    Height as of this encounter: 180.3 cm (71\").    Weight as of this encounter: 109 kg (240 lb).         Physical Exam  Vitals reviewed.   Constitutional:       General: He is not in acute distress.     Appearance: He is well-developed. He is not diaphoretic.   HENT:      Head: Normocephalic and atraumatic.   Cardiovascular:      Rate and Rhythm: Normal rate and regular rhythm.      Heart sounds: Normal heart sounds, S1 normal and S2 normal.   Pulmonary:      Effort: Pulmonary effort is normal.      Breath sounds: No wheezing, rhonchi or rales.   Neurological:      Mental Status: He is alert and oriented to person, place, and time.   Psychiatric:         Behavior: Behavior normal.        Result Review :  The following data was reviewed by: Vianey Duran PA-C on 05/18/2023:    Reviewed previous PFT. "   Reviewed previous xray report (2022).       Assessment and Plan   Diagnoses and all orders for this visit:    1. Severe persistent asthma, unspecified whether complicated (Primary)  -     Benralizumab solution prefilled syringe 30 mg  -     ipratropium-albuterol (DUO-NEB) 0.5-2.5 mg/3 ml nebulizer; Take 3 mL by nebulization 4 (Four) Times a Day As Needed for Wheezing or Shortness of Air.  Dispense: 360 mL; Refill: 8  -     predniSONE (DELTASONE) 5 MG tablet; Take 1 tablet by mouth See Admin Instructions. Take 10 mg daily; can try to cut down to 10mg/5mg on alternating days when symptoms are stable.  Dispense: 60 tablet; Refill: 8  -     ipratropium-albuterol (Combivent Respimat)  MCG/ACT inhaler; Inhale 1 puff Every 6 (Six) Hours As Needed for Wheezing or Shortness of Air.  Dispense: 4 g; Refill: 8    2. Former smoker        Severe persistent asthma (?COPD overlap) with acute respiratory, eosinophilic asthma:  • Continue albuterol inhaler as needed  • Continue Duonebs as needed.  • Ordered Combivent for an additional PRN option - might have more similar response to this as duonebs  • Continue Trelegy Ellipta 200 mcg once daily  • Continues with maintenance prednisone   Aims for lowest alternating daily dose as possible, but adjusts dosing as needed to maintain near maintenance of symptoms.   Typically takes 5 to 10 mg on alternating days.   • Continue singulair nightly  • Continue Fasenra injections scheduled  Has noted some overall benefit with use, currently well tolerated.   • May consider spirometry at the next visit.         Allergic rhinitis:  • Continue Claritin needed  • Continue Singulair nightly         Former smoker:   • Does not qualify for low dose CT imaging due to age (41 y/o).   Cessation achieved in approximately 15-16 years ago      Follow Up   Return in about 4 months (around 9/18/2023), or if symptoms worsen or fail to improve, for Next scheduled follow up.  Patient was given instructions  and counseling regarding his condition or for health maintenance advice. Please see specific information pulled into the AVS if appropriate.

## 2023-08-29 ENCOUNTER — SPECIALTY PHARMACY (OUTPATIENT)
Dept: PHARMACY | Facility: HOSPITAL | Age: 43
End: 2023-08-29
Payer: COMMERCIAL

## 2023-08-29 NOTE — PROGRESS NOTES
Specialty Pharmacy Refill Coordination Note      Name:  Lan Zheng  :  1980  Date:  2023         Past Medical History:   Diagnosis Date    Asthma     Chronic back pain     pinched nerve and slipped disc    COPD (chronic obstructive pulmonary disease)        Past Surgical History:   Procedure Laterality Date    EYE SURGERY         Social History     Socioeconomic History    Marital status:    Tobacco Use    Smoking status: Former     Packs/day: 3.00     Years: 20.00     Pack years: 60.00     Types: Cigarettes     Quit date: 10/2007     Years since quitting: 15.9    Smokeless tobacco: Never   Vaping Use    Vaping Use: Never used   Substance and Sexual Activity    Alcohol use: No    Drug use: No    Sexual activity: Defer       Family History   Problem Relation Age of Onset    Heart failure Father     Hypertension Father        No Known Allergies    Current Outpatient Medications   Medication Sig Dispense Refill    albuterol sulfate  (90 Base) MCG/ACT inhaler Inhale 2 puffs Every 4 (Four) Hours As Needed for Wheezing. 18 g 11    Benralizumab (Fasenra) 30 MG/ML solution prefilled syringe Inject 1 mL under the skin into the appropriate area as directed Every 2 (Two) Months. 1 mL 3    Benralizumab (Fasenra) 30 MG/ML solution prefilled syringe Inject 1 mL under the skin into the appropriate area as directed Every 2 (Two) Months. 1 mL 3    fluticasone (FLONASE) 50 MCG/ACT nasal spray 1 spray into the nostril(s) as directed by provider Daily.      HYDROcodone-acetaminophen (NORCO) 7.5-325 MG per tablet Take 1 tablet by mouth Every 8 (Eight) Hours As Needed.      ibuprofen (ADVIL,MOTRIN) 200 MG tablet Take 1 tablet by mouth Every 6 (Six) Hours As Needed for Mild Pain.      ipratropium-albuterol (Combivent Respimat)  MCG/ACT inhaler Inhale 1 puff Every 6 (Six) Hours As Needed for Wheezing or Shortness of Air. 4 g 8    ipratropium-albuterol (DUO-NEB) 0.5-2.5 mg/3 ml nebulizer Take 3 mL by  nebulization 4 (Four) Times a Day As Needed for Wheezing or Shortness of Air. 360 mL 8    loratadine (CLARITIN) 10 MG tablet Take 1 tablet by mouth Daily. 30 tablet 6    montelukast (SINGULAIR) 10 MG tablet Take 1 tablet by mouth every night at bedtime. 30 tablet 8    orphenadrine (NORFLEX) 100 MG 12 hr tablet Take 1 tablet by mouth 2 (Two) Times a Day As Needed.      predniSONE (DELTASONE) 5 MG tablet Take 1 tablet by mouth See Admin Instructions. Take 10 mg daily; can try to cut down to 10mg/5mg on alternating days when symptoms are stable. 60 tablet 8    SM Mucus Relief 600 MG 12 hr tablet Take 1 tablet by mouth 2 (Two) Times a Day.      Trelegy Ellipta 200-62.5-25 MCG/ACT aerosol powder  INHALE 1 PUFF BY MOUTH EVERY DAY 60 each 0     No current facility-administered medications for this visit.         ASSESSMENT/PLAN:      Lan is a 43 y.o. male contacted today regarding refills of  [facility] specialty medication(s).    Reviewed and verified with patient:       Specialty medication(s) and dose(s) confirmed: yes    Refill Questions      Flowsheet Row Most Recent Value   Changes to allergies? No   Changes to medications? No   New conditions since last clinic visit No   Unplanned office visit, urgent care, ED, or hospital admission in the last 4 weeks  No   How does patient/caregiver feel medication is working? Very good   Financial problems or insurance changes  No   If yes, describe changes in insurance or financial issues. na   Since the previous refill, were any specialty medication doses or scheduled injections missed or delayed?  No   If yes, please provide the amount na   Why were doses missed? na   Does this patient require a clinical escalation to a pharmacist? No                  Medication Adherence    Adherence tools used: directed education  Support network for adherence: healthcare provider          Follow-up: 60 day(s)     Aiyana Mckeon, Pharmacy Technician  Specialty Pharmacy Technician

## 2023-09-07 ENCOUNTER — CLINICAL SUPPORT (OUTPATIENT)
Dept: PULMONOLOGY | Facility: CLINIC | Age: 43
End: 2023-09-07
Payer: COMMERCIAL

## 2023-09-07 DIAGNOSIS — J45.50 SEVERE PERSISTENT ASTHMA, UNSPECIFIED WHETHER COMPLICATED: Primary | ICD-10-CM

## 2023-10-12 ENCOUNTER — OFFICE VISIT (OUTPATIENT)
Dept: PULMONOLOGY | Facility: CLINIC | Age: 43
End: 2023-10-12
Payer: COMMERCIAL

## 2023-10-12 VITALS
BODY MASS INDEX: 35 KG/M2 | SYSTOLIC BLOOD PRESSURE: 162 MMHG | HEART RATE: 94 BPM | WEIGHT: 250 LBS | TEMPERATURE: 97.8 F | OXYGEN SATURATION: 100 % | DIASTOLIC BLOOD PRESSURE: 98 MMHG | HEIGHT: 71 IN

## 2023-10-12 DIAGNOSIS — Z87.891 FORMER SMOKER: ICD-10-CM

## 2023-10-12 DIAGNOSIS — J45.51 SEVERE PERSISTENT ASTHMA WITH ACUTE EXACERBATION: Primary | ICD-10-CM

## 2023-10-12 DIAGNOSIS — J30.2 SEASONAL ALLERGIC RHINITIS, UNSPECIFIED TRIGGER: ICD-10-CM

## 2023-10-12 RX ORDER — METHYLPREDNISOLONE SODIUM SUCCINATE 40 MG/ML
40 INJECTION, POWDER, LYOPHILIZED, FOR SOLUTION INTRAMUSCULAR; INTRAVENOUS ONCE
Status: COMPLETED | OUTPATIENT
Start: 2023-10-12 | End: 2023-10-12

## 2023-10-12 RX ORDER — IPRATROPIUM BROMIDE AND ALBUTEROL 20; 100 UG/1; UG/1
1 SPRAY, METERED RESPIRATORY (INHALATION) EVERY 6 HOURS PRN
Qty: 4 G | Refills: 8 | Status: SHIPPED | OUTPATIENT
Start: 2023-10-12

## 2023-10-12 RX ORDER — AZITHROMYCIN 250 MG/1
TABLET, FILM COATED ORAL
Qty: 6 TABLET | Refills: 0 | Status: SHIPPED | OUTPATIENT
Start: 2023-10-12

## 2023-10-12 RX ORDER — MONTELUKAST SODIUM 10 MG/1
10 TABLET ORAL
Qty: 30 TABLET | Refills: 8 | Status: SHIPPED | OUTPATIENT
Start: 2023-10-12

## 2023-10-12 RX ORDER — PREDNISONE 5 MG/1
5 TABLET ORAL SEE ADMIN INSTRUCTIONS
Qty: 60 TABLET | Refills: 8 | Status: SHIPPED | OUTPATIENT
Start: 2023-10-12

## 2023-10-12 RX ORDER — GUAIFENESIN 600 MG/1
600 TABLET, EXTENDED RELEASE ORAL 2 TIMES DAILY PRN
Qty: 28 TABLET | Refills: 3 | Status: SHIPPED | OUTPATIENT
Start: 2023-10-12

## 2023-10-12 RX ORDER — PREDNISONE 10 MG/1
TABLET ORAL
Qty: 31 TABLET | Refills: 0 | Status: SHIPPED | OUTPATIENT
Start: 2023-10-12

## 2023-10-12 RX ORDER — FLUTICASONE FUROATE, UMECLIDINIUM BROMIDE AND VILANTEROL TRIFENATATE 200; 62.5; 25 UG/1; UG/1; UG/1
1 POWDER RESPIRATORY (INHALATION) DAILY
Qty: 60 EACH | Refills: 8 | Status: SHIPPED | OUTPATIENT
Start: 2023-10-12

## 2023-10-12 RX ADMIN — METHYLPREDNISOLONE SODIUM SUCCINATE 40 MG: 40 INJECTION, POWDER, LYOPHILIZED, FOR SOLUTION INTRAMUSCULAR; INTRAVENOUS at 10:59

## 2023-10-12 NOTE — PROGRESS NOTES
"Chief Complaint  Asthma    Subjective        Lan Zheng presents to [facility] PULMONARY & CRITICAL CARE MEDICINE  History of Present Illness    Mr. Zheng presents today for follow up of asthma, allergic rhinitis, former smoking history. Reports acute increase of symptoms today and over the last few days (following the recent weather changes). Notable for increased shortness of breath, some wheezing, coughing, chest congestion and generalized tightness.   Continues on maintenance option of Trelegy. Still takes alternating daily prednisone, but tries to minimalize use as possible. Continues on Fasenra. Has noted less frequency of flareups overall until now.       Objective   Vital Signs:  /98   Pulse 94   Temp 97.8 °F (36.6 °C) (Temporal)   Ht 180.3 cm (71\")   Wt 113 kg (250 lb)   SpO2 100%   BMI 34.87 kg/m²   Estimated body mass index is 34.87 kg/m² as calculated from the following:    Height as of this encounter: 180.3 cm (71\").    Weight as of this encounter: 113 kg (250 lb).         Physical Exam  Vitals reviewed.   Constitutional:       General: He is not in acute distress.     Appearance: He is well-developed. He is not diaphoretic.   HENT:      Head: Normocephalic and atraumatic.   Cardiovascular:      Rate and Rhythm: Normal rate and regular rhythm.   Pulmonary:      Effort: Pulmonary effort is normal.      Breath sounds: Wheezing present. No rhonchi or rales.   Neurological:      Mental Status: He is alert and oriented to person, place, and time.   Psychiatric:         Behavior: Behavior normal.        Result Review :  The following data was reviewed by: Vianey Duran PA-C on 10/12/2023:    Reviewed last PFT results.   Reviewed last chest xray report.       Assessment and Plan   Diagnoses and all orders for this visit:    1. Severe persistent asthma with acute exacerbation (Primary)  -     predniSONE (DELTASONE) 10 MG tablet; Take 4 tabs daily x 3 days, then take 3 tabs " daily x 3 days, then take 2 tabs daily x 3 days, then take 1 tab daily x 3 days  Dispense: 31 tablet; Refill: 0  -     azithromycin (ZITHROMAX) 250 MG tablet; Take 2 by mouth today then 1 daily for 4 days  Dispense: 6 tablet; Refill: 0  -     guaiFENesin (Mucinex) 600 MG 12 hr tablet; Take 1 tablet by mouth 2 (Two) Times a Day As Needed for Cough or Congestion.  Dispense: 28 tablet; Refill: 3  -     montelukast (SINGULAIR) 10 MG tablet; Take 1 tablet by mouth every night at bedtime.  Dispense: 30 tablet; Refill: 8  -     Fluticasone-Umeclidin-Vilant (Trelegy Ellipta) 200-62.5-25 MCG/ACT aerosol powder ; Inhale 1 puff Daily.  Dispense: 60 each; Refill: 8  -     predniSONE (DELTASONE) 5 MG tablet; Take 1 tablet by mouth See Admin Instructions. Take 10 mg daily; can try to cut down to 10mg/5mg on alternating days when symptoms are stable.  Dispense: 60 tablet; Refill: 8  -     ipratropium-albuterol (Combivent Respimat)  MCG/ACT inhaler; Inhale 1 puff Every 6 (Six) Hours As Needed for Wheezing or Shortness of Air.  Dispense: 4 g; Refill: 8  -     methylPREDNISolone sodium succinate (SOLU-Medrol) injection 40 mg    2. Seasonal allergic rhinitis, unspecified trigger    3. Former smoker          Severe persistent asthma with acute exacerbation, eosinophilic asthma:   Continue albuterol inhaler as needed  Continue duonebs as needed  Continue combivent for additional PRN option.  Continue Trelegy ellipta 200 mcg once daily  Continues with maintenance prednisone.   Typically needs 5-10 mg on alternating days  Still aims to decrease use as symptoms allow.   Continue Singulair nightly  Continue Fasenra injections as scheduled  Will hold off on spirometry today due to acute worsening of symptoms.   Administered IM steroid injection in office, followed by steroid taper  Ordered 5 day course azithromycin  Ordered mucinex for acute congestion  If symptoms do not improve or worsen, contact us as needed. Would recommend imaging  at that time.       Allergic rhinitis:   Continue Claritin as needed  Continue Singulair nightly      Former smoker:   Does not qualify for LDCT screenings due to age (< 50) and Cessation approximately 16 years (2007).        Follow Up   Return in about 3 months (around 1/12/2024), or if symptoms worsen or fail to improve, for Next scheduled follow up.  Patient was given instructions and counseling regarding his condition or for health maintenance advice. Please see specific information pulled into the AVS if appropriate.

## 2023-10-30 ENCOUNTER — SPECIALTY PHARMACY (OUTPATIENT)
Dept: PHARMACY | Facility: HOSPITAL | Age: 43
End: 2023-10-30
Payer: COMMERCIAL

## 2023-10-30 NOTE — PROGRESS NOTES
Specialty Pharmacy Refill Coordination Note     Lan is a 43 y.o. male contacted today regarding refills of  Fasenra specialty medication(s).    Reviewed and verified with patient:       Specialty medication(s) and dose(s) confirmed: yes    Refill Questions      Flowsheet Row Most Recent Value   Changes to allergies? No   Changes to medications? No   New conditions since last clinic visit No   Unplanned office visit, urgent care, ED, or hospital admission in the last 4 weeks  No   How does patient/caregiver feel medication is working? Very good   Financial problems or insurance changes  No   If yes, describe changes in insurance or financial issues. na   Since the previous refill, were any specialty medication doses or scheduled injections missed or delayed?  No   If yes, please provide the amount na   Why were doses missed? na   Does this patient require a clinical escalation to a pharmacist? No                  Medication Adherence          Adherence tools used: directed education      Support network for adherence: healthcare provider                Follow-up: 60 day(s)     Aiyana Mckeon Pharmacy Technician  Specialty Pharmacy Technician

## 2023-11-02 ENCOUNTER — OFFICE VISIT (OUTPATIENT)
Dept: PULMONOLOGY | Facility: CLINIC | Age: 43
End: 2023-11-02
Payer: COMMERCIAL

## 2023-11-02 VITALS
HEART RATE: 90 BPM | SYSTOLIC BLOOD PRESSURE: 142 MMHG | BODY MASS INDEX: 35 KG/M2 | DIASTOLIC BLOOD PRESSURE: 90 MMHG | OXYGEN SATURATION: 97 % | TEMPERATURE: 97.2 F | HEIGHT: 71 IN | WEIGHT: 250 LBS

## 2023-11-02 DIAGNOSIS — J45.50 SEVERE PERSISTENT ASTHMA WITHOUT COMPLICATION: Primary | ICD-10-CM

## 2023-11-02 DIAGNOSIS — Z87.891 FORMER SMOKER: ICD-10-CM

## 2023-11-02 DIAGNOSIS — J30.2 SEASONAL ALLERGIC RHINITIS, UNSPECIFIED TRIGGER: ICD-10-CM

## 2023-11-02 NOTE — PROGRESS NOTES
"Chief Complaint  Severe persistent asthma    Subjective        Lan Zheng presents to [facility] PULMONARY & CRITICAL CARE MEDICINE  History of Present Illness    Mr. Zheng presents today for follow up of severe asthma. He is doing fairly well at this time. Notable for flare up during the last visit, but did resolve. Continues on all current therapies without complaints.  Discussed that compared to pre-Fasenra times, asthma is much more stabilized. Previously, would frequently have severe flare ups that would often send him to the ED, and severe difficulty at baseline. He feels that symptoms are much more well controlled, and that flare ups now are typically a result of a trigger. Flare ups seem to occur at a much lower frequency.       Objective   Vital Signs:  /90   Pulse 90   Temp 97.2 °F (36.2 °C)   Ht 180.3 cm (70.98\")   Wt 113 kg (250 lb)   SpO2 97%   BMI 34.88 kg/m²   Estimated body mass index is 34.88 kg/m² as calculated from the following:    Height as of this encounter: 180.3 cm (70.98\").    Weight as of this encounter: 113 kg (250 lb).         Physical Exam  Vitals reviewed.   Constitutional:       General: He is not in acute distress.     Appearance: He is well-developed. He is not diaphoretic.   HENT:      Head: Normocephalic and atraumatic.   Cardiovascular:      Rate and Rhythm: Normal rate and regular rhythm.   Pulmonary:      Effort: Pulmonary effort is normal.      Breath sounds: No wheezing, rhonchi or rales.   Neurological:      Mental Status: He is alert and oriented to person, place, and time.   Psychiatric:         Behavior: Behavior normal.        Result Review :  The following data was reviewed by: Vianey Duran PA-C on 11/02/2023:    Reviewed the last PFT results.   Reviewed the last chest xray report.       Assessment and Plan   Diagnoses and all orders for this visit:    1. Severe persistent asthma without complication (Primary)  -     Benralizumab " solution prefilled syringe 30 mg    2. Seasonal allergic rhinitis, unspecified trigger    3. Former smoker        Severe persistent asthma with acute exacerbation, eosinophilic asthma:   Continue albuterol inhaler as needed  Continue duonebs as needed  Continue combivent for additional PRN option.  Continue Trelegy ellipta 200 mcg once daily  Did provide a sample of Trelegy 100 mcg so that we may be able to step down steroid use in some form (likely benefit more from the oral prednisone, and these are still needed at this time).   We will proceed with the most beneficial option.   Continues with maintenance prednisone.   Typically 5 mg on better days, increases to 10 mg as needed when more difficulty is noted.   Again, goal is to lower/discontinue as possible.   Continue singulair nightly.   Continue Fasenra as scheduled.   Has noted much more symptomatic control over the last year since starting these.        Allergic rhinitis:   Continue Claritin as needed  Continue Singulair nightly        Former smoker:   Does not qualify for LDCT screenings due to age (< 50) and Cessation approximately 16 years (2007).      Follow Up   Return in about 2 months (around 1/2/2024), or if symptoms worsen or fail to improve, for Next scheduled follow up.  Next follow up already scheduled for January, can keep this or extend as needed per his preference.       Patient was given instructions and counseling regarding his condition or for health maintenance advice. Please see specific information pulled into the AVS if appropriate.

## 2023-12-21 ENCOUNTER — SPECIALTY PHARMACY (OUTPATIENT)
Dept: PHARMACY | Facility: HOSPITAL | Age: 43
End: 2023-12-21
Payer: COMMERCIAL

## 2023-12-21 RX ORDER — BENRALIZUMAB 30 MG/ML
30 INJECTION, SOLUTION SUBCUTANEOUS
Qty: 1 ML | Refills: 3 | Status: SHIPPED | OUTPATIENT
Start: 2023-12-21

## 2023-12-21 NOTE — PROGRESS NOTES
[facility]  Asthma Management     Lan Zheng is a 43 y.o. male referred by Vianey Duran  from Pulmonology to the [facility] for severe eosinophilic asthma.  The patient's current asthma regimen includes: Trelegy and Fasenra and Pt reports good adherence to maintenance regimen. Pt reports he is not a smoker or exposed to second hand smoke. He reports tolerating Fasenra well with no issues and gets his Fasenra administered by a Pulmonology MA. He reports that he does feel a difference with Fasenra being on board compared to not having it on board.     Lan Zheng reports asthma kept them from getting as much done most of the time and having shortness of breath 3-6 times a week   Pt reports nighttime awakening about 2-3 nights a week due to asthma symptoms and using a rescue inhaler 2-3 times per week.    Pt self rates asthma control as somewhat controlled.            Past Medical History:   Diagnosis Date    Asthma     Chronic back pain     pinched nerve and slipped disc    COPD (chronic obstructive pulmonary disease)      Social History     Socioeconomic History    Marital status:    Tobacco Use    Smoking status: Former     Packs/day: 3     Types: Cigarettes     Quit date: 10/2007     Years since quittin.2     Passive exposure: Past    Smokeless tobacco: Never   Vaping Use    Vaping Use: Never used   Substance and Sexual Activity    Alcohol use: No    Drug use: No    Sexual activity: Defer     Patient has no known allergies.    Current Outpatient Medications:     albuterol sulfate  (90 Base) MCG/ACT inhaler, Inhale 2 puffs Every 4 (Four) Hours As Needed for Wheezing., Disp: 18 g, Rfl: 11    Benralizumab (Fasenra) 30 MG/ML solution prefilled syringe, Inject 1 mL under the skin into the appropriate area as directed Every 2 (Two) Months., Disp: 1 mL, Rfl: 3    Fluticasone-Umeclidin-Vilant (Trelegy Ellipta) 200-62.5-25 MCG/ACT aerosol powder , Inhale 1 puff  "Daily., Disp: 60 each, Rfl: 8    guaiFENesin (Mucinex) 600 MG 12 hr tablet, Take 1 tablet by mouth 2 (Two) Times a Day As Needed for Cough or Congestion., Disp: 28 tablet, Rfl: 3    HYDROcodone-acetaminophen (NORCO) 7.5-325 MG per tablet, Take 1 tablet by mouth Every 8 (Eight) Hours As Needed., Disp: , Rfl:     ibuprofen (ADVIL,MOTRIN) 200 MG tablet, Take 1 tablet by mouth Every 6 (Six) Hours As Needed for Mild Pain., Disp: , Rfl:     ipratropium-albuterol (Combivent Respimat)  MCG/ACT inhaler, Inhale 1 puff Every 6 (Six) Hours As Needed for Wheezing or Shortness of Air., Disp: 4 g, Rfl: 8    ipratropium-albuterol (DUO-NEB) 0.5-2.5 mg/3 ml nebulizer, Take 3 mL by nebulization 4 (Four) Times a Day As Needed for Wheezing or Shortness of Air., Disp: 360 mL, Rfl: 8    loratadine (CLARITIN) 10 MG tablet, Take 1 tablet by mouth Daily., Disp: 30 tablet, Rfl: 6    montelukast (SINGULAIR) 10 MG tablet, Take 1 tablet by mouth every night at bedtime., Disp: 30 tablet, Rfl: 8    orphenadrine (NORFLEX) 100 MG 12 hr tablet, Take 1 tablet by mouth 2 (Two) Times a Day As Needed., Disp: , Rfl:     predniSONE (DELTASONE) 5 MG tablet, Take 1 tablet by mouth See Admin Instructions. Take 10 mg daily; can try to cut down to 10mg/5mg on alternating days when symptoms are stable., Disp: 60 tablet, Rfl: 8    Benralizumab (Fasenra) 30 MG/ML solution prefilled syringe, Inject 1 mL under the skin into the appropriate area as directed Every 2 (Two) Months., Disp: 1 mL, Rfl: 3    fluticasone (FLONASE) 50 MCG/ACT nasal spray, 1 spray into the nostril(s) as directed by provider Daily., Disp: , Rfl:     Current Facility-Administered Medications:     Benralizumab solution prefilled syringe 30 mg, 30 mg, Subcutaneous, Q2 Months, Vianey Duran PA-C, 30 mg at 09/07/23 1005      Labs:    There were no vitals filed for this visit.  There were no vitals filed for this visit.  No results found for: \"GLUCOSE\", \"CALCIUM\", \"NA\", \"K\", \"CO2\", " "\"CL\", \"BUN\", \"CREATININE\", \"EGFRIFAFRI\", \"EGFRIFNONA\", \"BCR\", \"ANIONGAP\"    Asthma Control Test Results:   0-15 Very Poorly Controlled Asthma   15-20 Poorly Controlled Asthma  20-25 Well Controlled Asthma     Date  7/22/22 1/11/23 6/28/23 12/21/23     ACT Results 11 14 13 13     ACT Results Very Poorly  Controlled Asthma  Very Poorly Controlled Asthma  Very Poorly Controlled Asthma  Very Poorly Controlled Asthma          Vaccination Status   COVID 19: Needs booster, declines  Influenza: UTD  Pneumococcal: Needs, declines     Drug-Drug Interactions: None with Fasenra    Drug-Disease Interactions (non-cardioselective beta blockers, NSAIDs): Ibuprofen, recommended use only sparingly     Patient Assistance: N/A    Inhaler Technique Observed? No  If yes, notes:     Treatment Goals: Risk Reduction and Symptom Control     Medication Assessment & Plan:     Patient will continue Fasenra 30mg Pre-filled syringe then once every 8 weeks for severe eosinophillic asthma.     Advised Pt on the importance of continuing maintenance inhaler regimen and the importance of rinsing mouth after ICS use. This injection does not replace your maintenance inhaler and is not used to treat an asthma attack (use a rescue inhaler).     Recommended pneumococcal vaccination and shingles vaccination.     Patient will continue regular follow-up with pulmonology. Patient  will follow-up with specialty  services and pulmonology MA will administer.      Will follow-up in 6 months, or sooner if needed.     Dorothy Torres [facility]  12/21/2023  15:32 EST  "

## 2023-12-21 NOTE — PROGRESS NOTES
Specialty Pharmacy Refill Coordination Note      Name:  Lan Zheng  :  1980  Date:  2023         Past Medical History:   Diagnosis Date    Asthma     Chronic back pain     pinched nerve and slipped disc    COPD (chronic obstructive pulmonary disease)        Past Surgical History:   Procedure Laterality Date    EYE SURGERY         Social History     Socioeconomic History    Marital status:    Tobacco Use    Smoking status: Former     Packs/day: 3     Types: Cigarettes     Quit date: 10/2007     Years since quittin.2     Passive exposure: Past    Smokeless tobacco: Never   Vaping Use    Vaping Use: Never used   Substance and Sexual Activity    Alcohol use: No    Drug use: No    Sexual activity: Defer       Family History   Problem Relation Age of Onset    Heart failure Father     Hypertension Father        No Known Allergies    Current Outpatient Medications   Medication Sig Dispense Refill    albuterol sulfate  (90 Base) MCG/ACT inhaler Inhale 2 puffs Every 4 (Four) Hours As Needed for Wheezing. 18 g 11    Benralizumab (Fasenra) 30 MG/ML solution prefilled syringe Inject 1 mL under the skin into the appropriate area as directed Every 2 (Two) Months. 1 mL 3    fluticasone (FLONASE) 50 MCG/ACT nasal spray 1 spray into the nostril(s) as directed by provider Daily.      Fluticasone-Umeclidin-Vilant (Trelegy Ellipta) 200-62.5-25 MCG/ACT aerosol powder  Inhale 1 puff Daily. 60 each 8    guaiFENesin (Mucinex) 600 MG 12 hr tablet Take 1 tablet by mouth 2 (Two) Times a Day As Needed for Cough or Congestion. 28 tablet 3    HYDROcodone-acetaminophen (NORCO) 7.5-325 MG per tablet Take 1 tablet by mouth Every 8 (Eight) Hours As Needed.      ibuprofen (ADVIL,MOTRIN) 200 MG tablet Take 1 tablet by mouth Every 6 (Six) Hours As Needed for Mild Pain.      ipratropium-albuterol (Combivent Respimat)  MCG/ACT inhaler Inhale 1 puff Every 6 (Six) Hours As Needed for Wheezing or Shortness of Air.  4 g 8    ipratropium-albuterol (DUO-NEB) 0.5-2.5 mg/3 ml nebulizer Take 3 mL by nebulization 4 (Four) Times a Day As Needed for Wheezing or Shortness of Air. 360 mL 8    loratadine (CLARITIN) 10 MG tablet Take 1 tablet by mouth Daily. 30 tablet 6    montelukast (SINGULAIR) 10 MG tablet Take 1 tablet by mouth every night at bedtime. 30 tablet 8    orphenadrine (NORFLEX) 100 MG 12 hr tablet Take 1 tablet by mouth 2 (Two) Times a Day As Needed.      predniSONE (DELTASONE) 5 MG tablet Take 1 tablet by mouth See Admin Instructions. Take 10 mg daily; can try to cut down to 10mg/5mg on alternating days when symptoms are stable. 60 tablet 8     Current Facility-Administered Medications   Medication Dose Route Frequency Provider Last Rate Last Admin    Benralizumab solution prefilled syringe 30 mg  30 mg Subcutaneous Q2 Months Vianey Duran PA-C   30 mg at 09/07/23 1005         ASSESSMENT/PLAN:      Lan is a 43 y.o. male contacted today regarding refills of  [facility] specialty medication(s).    Reviewed and verified with patient:  Allergies  Meds  Problems       Specialty medication(s) and dose(s) confirmed: yes    Refill Questions      Flowsheet Row Most Recent Value   Changes to allergies? No   Changes to medications? No   New conditions or infections since last clinic visit No   Unplanned office visit, urgent care, ED, or hospital admission in the last 4 weeks  No   How does patient/caregiver feel medication is working? Very good   Financial problems or insurance changes  No   If yes, describe changes in insurance or financial issues. na   Since the previous refill, were any specialty medication doses or scheduled injections missed or delayed?  No   If yes, please provide the amount na   Why were doses missed? na   Does this patient require a clinical escalation to a pharmacist? No                  Medication Adherence          Adherence tools used: directed education      Support network for adherence:  healthcare provider                Follow-up: 56  day(s)     Dorothy Torrse [facility]  Specialty Pharmacy Technician

## 2023-12-28 ENCOUNTER — CLINICAL SUPPORT (OUTPATIENT)
Dept: PULMONOLOGY | Facility: CLINIC | Age: 43
End: 2023-12-28
Payer: COMMERCIAL

## 2023-12-28 DIAGNOSIS — J45.50 SEVERE PERSISTENT ASTHMA WITHOUT COMPLICATION: Primary | ICD-10-CM

## 2024-01-12 ENCOUNTER — OFFICE VISIT (OUTPATIENT)
Dept: PULMONOLOGY | Facility: CLINIC | Age: 44
End: 2024-01-12
Payer: COMMERCIAL

## 2024-01-12 VITALS
WEIGHT: 250 LBS | HEIGHT: 71 IN | HEART RATE: 87 BPM | TEMPERATURE: 97.1 F | DIASTOLIC BLOOD PRESSURE: 90 MMHG | SYSTOLIC BLOOD PRESSURE: 132 MMHG | OXYGEN SATURATION: 98 % | BODY MASS INDEX: 35 KG/M2

## 2024-01-12 DIAGNOSIS — J45.50 SEVERE PERSISTENT ASTHMA, UNSPECIFIED WHETHER COMPLICATED: ICD-10-CM

## 2024-01-12 PROCEDURE — 99213 OFFICE O/P EST LOW 20 MIN: CPT | Performed by: PHYSICIAN ASSISTANT

## 2024-01-12 PROCEDURE — 1159F MED LIST DOCD IN RCRD: CPT | Performed by: PHYSICIAN ASSISTANT

## 2024-01-12 PROCEDURE — 1160F RVW MEDS BY RX/DR IN RCRD: CPT | Performed by: PHYSICIAN ASSISTANT

## 2024-01-12 RX ORDER — IPRATROPIUM BROMIDE AND ALBUTEROL 20; 100 UG/1; UG/1
1 SPRAY, METERED RESPIRATORY (INHALATION) 4 TIMES DAILY
COMMUNITY
Start: 2023-09-11

## 2024-01-12 RX ORDER — IPRATROPIUM BROMIDE AND ALBUTEROL SULFATE 2.5; .5 MG/3ML; MG/3ML
3 SOLUTION RESPIRATORY (INHALATION) 4 TIMES DAILY PRN
Qty: 540 ML | Refills: 8 | Status: SHIPPED | OUTPATIENT
Start: 2024-01-12

## 2024-01-12 RX ORDER — FLUTICASONE FUROATE, UMECLIDINIUM BROMIDE AND VILANTEROL TRIFENATATE 100; 62.5; 25 UG/1; UG/1; UG/1
1 POWDER RESPIRATORY (INHALATION)
Qty: 30 EACH | Refills: 8 | Status: SHIPPED | OUTPATIENT
Start: 2024-01-12

## 2024-01-12 RX ORDER — IPRATROPIUM BROMIDE AND ALBUTEROL SULFATE 2.5; .5 MG/3ML; MG/3ML
3 SOLUTION RESPIRATORY (INHALATION) 4 TIMES DAILY PRN
Qty: 360 ML | Refills: 8 | Status: CANCELLED | OUTPATIENT
Start: 2024-01-12

## 2024-01-12 NOTE — PROGRESS NOTES
"Chief Complaint  Severe persistent asthma without complication    Subjective        Lan Zheng presents to [facility] PULMONARY & CRITICAL CARE MEDICINE  History of Present Illness    Mr. Zheng presents today for follow up of severe asthma.   Notable for some symptoms as a child, but not as severe until later into adulthood. Was previously very active as he worked for a [employer] company early on until symptoms develops. When first diagnosed, stated that they completed many CT scans but found no appreciable findings. States that his father had emphysema.   Had previous personal smoking history, quit in 2007.     Still can have flare ups 2-3 times per month that can leave him with increased breathing problems up to 4 days on average. Thus, no longer able to work. Notes sensitivity to many triggers that have led to hospitalizations previously (many early on, less now but can still happen on occasion). Is able to avoid many of the known triggers now (paint fumes, cleaning chemicals, etc) when possible. He has noted more control over time with the current therapies for management (listed below).   Has had COVID-19 on several occasions which thankfully did not made him severely ill.       Objective   Vital Signs:  /90   Pulse 87   Temp 97.1 °F (36.2 °C)   Ht 180.3 cm (71\")   Wt 113 kg (250 lb)   SpO2 98%   BMI 34.87 kg/m²   Estimated body mass index is 34.87 kg/m² as calculated from the following:    Height as of this encounter: 180.3 cm (71\").    Weight as of this encounter: 113 kg (250 lb).         Physical Exam  Vitals reviewed.   Constitutional:       General: He is not in acute distress.     Appearance: He is well-developed. He is not diaphoretic.   HENT:      Head: Normocephalic and atraumatic.   Cardiovascular:      Rate and Rhythm: Normal rate and regular rhythm.   Pulmonary:      Effort: Pulmonary effort is normal.      Breath sounds: No wheezing, rhonchi or rales.   Neurological: "      Mental Status: He is alert and oriented to person, place, and time.   Psychiatric:         Behavior: Behavior normal.        Result Review :  The following data was reviewed by: Vianey Duran PA-C on 01/12/2024:  Reviewed last PFT.   Reviewed previous chest xray reports.        Assessment and Plan   Diagnoses and all orders for this visit:    1. Severe persistent asthma, unspecified whether complicated  -     Fluticasone-Umeclidin-Vilant (Trelegy Ellipta) 100-62.5-25 MCG/ACT inhaler; Inhale 1 puff Daily.  Dispense: 30 each; Refill: 8  -     ipratropium-albuterol (DUO-NEB) 0.5-2.5 mg/3 ml nebulizer; Take 3 mL by nebulization 4 (Four) Times a Day As Needed for Wheezing or Shortness of Air.  Dispense: 540 mL; Refill: 8          Severe persistent asthma:   May try to get new Spirometry at the next visit.   Continue rescue inhaler as needed (albuterol or combivent)  Continue Duonebs as needed  Patient needed refill of 3 boxes (540 mL)  Continue Trelegy inhaler - will decrease back down to 100 mcg daily since we are also using low dose oral steroids.   No major difference noted between 200 mcg and 100 mcg with previous sample  Continues 5 mg maintenance prednisone as needed.   Typically daily.   Occasionally needs to increase to 10 mg to help prevent severe flare up  Continues Singulair nightly  Continue Fasenra scheduled for now  He has noticed some benefit in symptom maintenance, but symptoms can still be very impairing.   He has been on Fasenra since early 2022.  Still requiring near daily oral steroids, but have decreased the dosing.   Will see if insurance will allow change of biologic to Tezspire (may find more benefit due to slightly different mechanism of action).   Patient is willing to try this change.         Follow Up   Return in about 4 months (around 5/12/2024), or if symptoms worsen or fail to improve, for Next scheduled follow up.  Patient was given instructions and counseling regarding his  condition or for health maintenance advice. Please see specific information pulled into the AVS if appropriate.

## 2024-03-12 ENCOUNTER — SPECIALTY PHARMACY (OUTPATIENT)
Dept: PHARMACY | Facility: HOSPITAL | Age: 44
End: 2024-03-12
Payer: COMMERCIAL

## 2024-03-19 ENCOUNTER — DISEASE STATE MANAGEMENT VISIT (OUTPATIENT)
Dept: PHARMACY | Facility: HOSPITAL | Age: 44
End: 2024-03-19
Payer: COMMERCIAL

## 2024-03-19 ENCOUNTER — SPECIALTY PHARMACY (OUTPATIENT)
Dept: PHARMACY | Facility: HOSPITAL | Age: 44
End: 2024-03-19
Payer: COMMERCIAL

## 2024-03-19 RX ORDER — TEZEPELUMAB-EKKO 210 MG/1.9ML
210 INJECTION, SOLUTION SUBCUTANEOUS
Qty: 1.91 ML | Refills: 5 | Status: SHIPPED | OUTPATIENT
Start: 2024-03-19

## 2024-03-19 NOTE — PROGRESS NOTES
[facility]  Asthma Management     Lan Zheng is a 43 y.o. male referred by Vianey Duran  from Pulmonology to the [facility] for severe eosinophilic asthma.  The patient's current asthma regimen includes: Trelegy, Albuterol Inhaler PRN, Combivent Inhaler PRN, Singulair, Claritin PRN, and is on a daily dose of Prednisone (currently at 5 mg).  Pt reports good adherence to maintenance regimen. Pt reports he is not a smoker or exposed to second hand smoke. Patient has most recently been on Fasenra since . While he does report that he noticed improved breathing with the Fasenra versus without, patient has continued needing daily steroid use. He has, however, at least been able to decrease the maintenance dose of this. Patient reports he has not had a dose of Fasenra in a few months (appears last documented injection with Pulmonology Clinic is 23).     New referral has been sent by Vianey Duran to change patient to Tezspire.     Lan Zheng reports asthma kept them from getting as much done some of the time and having shortness of breath more than once a day.   Pt reports nighttime awakening about 2-3 nights a week due to asthma symptoms and using a rescue inhaler 3 or more times a day.    Pt self rates asthma control as Well controlled.            Past Medical History:   Diagnosis Date    Asthma     Chronic back pain     pinched nerve and slipped disc    COPD (chronic obstructive pulmonary disease)      Social History     Socioeconomic History    Marital status:    Tobacco Use    Smoking status: Former     Current packs/day: 0.00     Types: Cigarettes     Quit date: 10/2007     Years since quittin.4     Passive exposure: Past    Smokeless tobacco: Never   Vaping Use    Vaping status: Never Used   Substance and Sexual Activity    Alcohol use: No    Drug use: No    Sexual activity: Defer     Patient has no known allergies.    Current Outpatient Medications:      albuterol sulfate  (90 Base) MCG/ACT inhaler, Inhale 2 puffs Every 4 (Four) Hours As Needed for Wheezing., Disp: 18 g, Rfl: 11    fluticasone (FLONASE) 50 MCG/ACT nasal spray, 1 spray into the nostril(s) as directed by provider Daily., Disp: , Rfl:     Fluticasone-Umeclidin-Vilant (Trelegy Ellipta) 100-62.5-25 MCG/ACT inhaler, Inhale 1 puff Daily., Disp: 30 each, Rfl: 8    guaiFENesin (Mucinex) 600 MG 12 hr tablet, Take 1 tablet by mouth 2 (Two) Times a Day As Needed for Cough or Congestion., Disp: 28 tablet, Rfl: 3    HYDROcodone-acetaminophen (NORCO) 7.5-325 MG per tablet, Take 1 tablet by mouth Every 8 (Eight) Hours As Needed., Disp: , Rfl:     ibuprofen (ADVIL,MOTRIN) 200 MG tablet, Take 1 tablet by mouth Every 6 (Six) Hours As Needed for Mild Pain., Disp: , Rfl:     ipratropium-albuterol (Combivent Respimat)  MCG/ACT inhaler, Inhale 1 puff Every 6 (Six) Hours As Needed for Wheezing or Shortness of Air., Disp: 4 g, Rfl: 8    ipratropium-albuterol (DUO-NEB) 0.5-2.5 mg/3 ml nebulizer, Take 3 mL by nebulization 4 (Four) Times a Day As Needed for Wheezing or Shortness of Air., Disp: 540 mL, Rfl: 8    loratadine (CLARITIN) 10 MG tablet, Take 1 tablet by mouth Daily., Disp: 30 tablet, Rfl: 6    montelukast (SINGULAIR) 10 MG tablet, Take 1 tablet by mouth every night at bedtime., Disp: 30 tablet, Rfl: 8    orphenadrine (NORFLEX) 100 MG 12 hr tablet, Take 1 tablet by mouth 2 (Two) Times a Day As Needed., Disp: , Rfl:     predniSONE (DELTASONE) 5 MG tablet, Take 1 tablet by mouth See Admin Instructions. Take 10 mg daily; can try to cut down to 10mg/5mg on alternating days when symptoms are stable., Disp: 60 tablet, Rfl: 8    Tezepelumab-ekko (Tezspire) 210 MG/1.91ML solution auto-injector, Inject 1.91 mL under the skin into the appropriate area as directed Every 28 (Twenty-Eight) Days., Disp: 1.91 mL, Rfl: 5  No current facility-administered medications for this visit.      Labs:    There were no vitals  "filed for this visit.  There were no vitals filed for this visit.  No results found for: \"GLUCOSE\", \"CALCIUM\", \"NA\", \"K\", \"CO2\", \"CL\", \"BUN\", \"CREATININE\", \"EGFRIFAFRI\", \"EGFRIFNONA\", \"BCR\", \"ANIONGAP\"    Asthma Control Test Results:   0-15 Very Poorly Controlled Asthma   15-20 Poorly Controlled Asthma  20-25 Well Controlled Asthma     Date  7/22/22 1/11/23 6/28/23 12/21/23 3/19/24    ACT Results 11 14 13 13 11    ACT Results Very Poorly  Controlled Asthma  Very Poorly Controlled Asthma  Very Poorly Controlled Asthma  Very Poorly Controlled Asthma  Very Poorly Controlled Asthma        Vaccination Status   COVID 19: Needs booster, declines  Influenza: UTD  Pneumococcal: Needs, declines     Drug-Drug Interactions:No significant drug-drug interactions expected with Tezspire according to literature.     Drug-Disease Interactions (non-cardioselective beta blockers, NSAIDs): Ibuprofen, recommended use only sparingly     Patient Assistance: N/A    Inhaler Technique Observed? No  If yes, notes:     Treatment Goals: Risk Reduction and Symptom Control     Medication Assessment & Plan:     Patient will begin Tezspire 210 mg SubQ every 4 weeks for severe asthma. Medication education and counseling provided. Educated Pt that this injection must be administered by a healthcare professional.  Patient counseled on potential side effects including injection-site reactions, back pain, joint pain and mouth or throat pain/irritation.  Educated Pt on the potential for opportunistic infection of helminth infections. Advised Pt to avoid live vaccines while on this medication. Discussed S/Sx of an allergic reaction. Counseled Pt to go to ED with any S/Sx of allergic reaction. Medication guide provided.     I administered into the back of the LEFT arm. Patient was requested to remain on clinic for 15 minutes following injection. Patient reported feeling well when leaving clinic.     Advised Pt on the importance of continuing maintenance " inhaler regimen and the importance of rinsing mouth after ICS use. This injection does not replace your maintenance inhaler and is not used to treat an asthma attack (use a rescue inhaler).     Recommended pneumococcal vaccination and shingles vaccination.     Patient will follow up with specialty clinic for each injection. Patient will return in 4 weeks for each injection.     Will follow-up in 6 months, or sooner if needed.     Diya Linares. Tereza, PharmKEILY  3/19/2024  10:50 EDT

## 2024-03-19 NOTE — PROGRESS NOTES
[facility]  Asthma Management     Lan Zheng is a 43 y.o. male referred by Vianey Duran  from Pulmonology to the [facility] for severe eosinophilic asthma.  The patient's current asthma regimen includes: Trelegy, Albuterol Inhaler PRN, Combivent Inhaler PRN, Singulair, Claritin PRN, and is on a daily dose of Prednisone (currently at 5 mg).  Pt reports good adherence to maintenance regimen. Pt reports he is not a smoker or exposed to second hand smoke. Patient has most recently been on Fasenra since . While he does report that he noticed improved breathing with the Fasenra versus without, patient has continued needing daily steroid use. He has, however, at least been able to decrease the maintenance dose of this. Patient reports he has not had a dose of Fasenra in a few months (appears last documented injection with Pulmonology Clinic is 23).     New referral has been sent by Vianey Duran to change patient to Tezspire.     Lan Zheng reports asthma kept them from getting as much done some of the time and having shortness of breath more than once a day.   Pt reports nighttime awakening about 2-3 nights a week due to asthma symptoms and using a rescue inhaler 3 or more times a day.    Pt self rates asthma control as Well controlled.            Past Medical History:   Diagnosis Date    Asthma     Chronic back pain     pinched nerve and slipped disc    COPD (chronic obstructive pulmonary disease)      Social History     Socioeconomic History    Marital status:    Tobacco Use    Smoking status: Former     Current packs/day: 0.00     Types: Cigarettes     Quit date: 10/2007     Years since quittin.4     Passive exposure: Past    Smokeless tobacco: Never   Vaping Use    Vaping status: Never Used   Substance and Sexual Activity    Alcohol use: No    Drug use: No    Sexual activity: Defer     Patient has no known allergies.    Current Outpatient Medications:      albuterol sulfate  (90 Base) MCG/ACT inhaler, Inhale 2 puffs Every 4 (Four) Hours As Needed for Wheezing., Disp: 18 g, Rfl: 11    fluticasone (FLONASE) 50 MCG/ACT nasal spray, 1 spray into the nostril(s) as directed by provider Daily., Disp: , Rfl:     Fluticasone-Umeclidin-Vilant (Trelegy Ellipta) 100-62.5-25 MCG/ACT inhaler, Inhale 1 puff Daily., Disp: 30 each, Rfl: 8    guaiFENesin (Mucinex) 600 MG 12 hr tablet, Take 1 tablet by mouth 2 (Two) Times a Day As Needed for Cough or Congestion., Disp: 28 tablet, Rfl: 3    HYDROcodone-acetaminophen (NORCO) 7.5-325 MG per tablet, Take 1 tablet by mouth Every 8 (Eight) Hours As Needed., Disp: , Rfl:     ibuprofen (ADVIL,MOTRIN) 200 MG tablet, Take 1 tablet by mouth Every 6 (Six) Hours As Needed for Mild Pain., Disp: , Rfl:     ipratropium-albuterol (Combivent Respimat)  MCG/ACT inhaler, Inhale 1 puff Every 6 (Six) Hours As Needed for Wheezing or Shortness of Air., Disp: 4 g, Rfl: 8    ipratropium-albuterol (Combivent Respimat)  MCG/ACT inhaler, Inhale 1 puff 4 (Four) Times a Day., Disp: , Rfl:     ipratropium-albuterol (DUO-NEB) 0.5-2.5 mg/3 ml nebulizer, Take 3 mL by nebulization 4 (Four) Times a Day As Needed for Wheezing or Shortness of Air., Disp: 540 mL, Rfl: 8    loratadine (CLARITIN) 10 MG tablet, Take 1 tablet by mouth Daily., Disp: 30 tablet, Rfl: 6    montelukast (SINGULAIR) 10 MG tablet, Take 1 tablet by mouth every night at bedtime., Disp: 30 tablet, Rfl: 8    orphenadrine (NORFLEX) 100 MG 12 hr tablet, Take 1 tablet by mouth 2 (Two) Times a Day As Needed., Disp: , Rfl:     predniSONE (DELTASONE) 5 MG tablet, Take 1 tablet by mouth See Admin Instructions. Take 10 mg daily; can try to cut down to 10mg/5mg on alternating days when symptoms are stable., Disp: 60 tablet, Rfl: 8    Tezepelumab-ekko (Tezspire) 210 MG/1.91ML solution auto-injector, Inject 1.91 mL under the skin into the appropriate area as directed Every 28 (Twenty-Eight)  "Days., Disp: 1.91 mL, Rfl: 5  No current facility-administered medications for this visit.      Labs:    There were no vitals filed for this visit.  There were no vitals filed for this visit.  No results found for: \"GLUCOSE\", \"CALCIUM\", \"NA\", \"K\", \"CO2\", \"CL\", \"BUN\", \"CREATININE\", \"EGFRIFAFRI\", \"EGFRIFNONA\", \"BCR\", \"ANIONGAP\"    Asthma Control Test Results:   0-15 Very Poorly Controlled Asthma   15-20 Poorly Controlled Asthma  20-25 Well Controlled Asthma     Date  7/22/22 1/11/23 6/28/23 12/21/23 3/19/24    ACT Results 11 14 13 13 11    ACT Results Very Poorly  Controlled Asthma  Very Poorly Controlled Asthma  Very Poorly Controlled Asthma  Very Poorly Controlled Asthma  Very Poorly Controlled Asthma        Vaccination Status   COVID 19: Needs booster, declines  Influenza: UTD  Pneumococcal: Needs, declines     Drug-Drug Interactions:No significant drug-drug interactions expected with Tezspire according to literature.     Drug-Disease Interactions (non-cardioselective beta blockers, NSAIDs): Ibuprofen, recommended use only sparingly     Patient Assistance: N/A    Inhaler Technique Observed? No  If yes, notes:     Treatment Goals: Risk Reduction and Symptom Control     Medication Assessment & Plan:     Patient will begin Tezspire 210 mg SubQ every 4 weeks for severe asthma. Medication education and counseling provided. Educated Pt that this injection must be administered by a healthcare professional.  Patient counseled on potential side effects including injection-site reactions, back pain, joint pain and mouth or throat pain/irritation.  Educated Pt on the potential for opportunistic infection of helminth infections. Advised Pt to avoid live vaccines while on this medication. Discussed S/Sx of an allergic reaction. Counseled Pt to go to ED with any S/Sx of allergic reaction. Medication guide provided.     I administered into the back of the LEFT arm. Patient was requested to remain on clinic for 15 minutes " following injection. Patient reported feeling well when leaving clinic.     Advised Pt on the importance of continuing maintenance inhaler regimen and the importance of rinsing mouth after ICS use. This injection does not replace your maintenance inhaler and is not used to treat an asthma attack (use a rescue inhaler).     Recommended pneumococcal vaccination and shingles vaccination.     Patient will follow up with specialty clinic for each injection. Patient will return in 4 weeks for each injection.     Will follow-up in 6 months, or sooner if needed.     Diya Linares. Tereza, PharmD  3/19/2024  10:19 EDT

## 2024-04-11 ENCOUNTER — SPECIALTY PHARMACY (OUTPATIENT)
Dept: PHARMACY | Facility: HOSPITAL | Age: 44
End: 2024-04-11

## 2024-05-13 ENCOUNTER — OFFICE VISIT (OUTPATIENT)
Dept: PULMONOLOGY | Facility: CLINIC | Age: 44
End: 2024-05-13
Payer: COMMERCIAL

## 2024-05-13 VITALS
TEMPERATURE: 97.2 F | OXYGEN SATURATION: 96 % | HEART RATE: 89 BPM | SYSTOLIC BLOOD PRESSURE: 152 MMHG | HEIGHT: 71 IN | BODY MASS INDEX: 35 KG/M2 | DIASTOLIC BLOOD PRESSURE: 90 MMHG | WEIGHT: 250 LBS

## 2024-05-13 DIAGNOSIS — J45.50 SEVERE PERSISTENT ASTHMA, UNSPECIFIED WHETHER COMPLICATED: Primary | ICD-10-CM

## 2024-05-13 PROCEDURE — 99213 OFFICE O/P EST LOW 20 MIN: CPT | Performed by: PHYSICIAN ASSISTANT

## 2024-05-13 PROCEDURE — 1159F MED LIST DOCD IN RCRD: CPT | Performed by: PHYSICIAN ASSISTANT

## 2024-05-13 PROCEDURE — 1160F RVW MEDS BY RX/DR IN RCRD: CPT | Performed by: PHYSICIAN ASSISTANT

## 2024-05-13 RX ORDER — CEFDINIR 300 MG/1
CAPSULE ORAL
COMMUNITY
Start: 2024-04-02

## 2024-05-13 RX ORDER — AMLODIPINE BESYLATE 5 MG/1
5 TABLET ORAL DAILY
COMMUNITY
Start: 2024-01-30 | End: 2025-01-29

## 2024-05-13 NOTE — PROGRESS NOTES
"Chief Complaint  Severe persistent asthma, unspecified whether complicated    Subjective        Lan Zheng presents to [facility] PULMON[facility] & CRIT[facility] CARE MEDICINE  History of Present Illness    Patient presents for follow-up of severe asthma.  Continues on previous maintenance options.  Overall symptoms are staying fairly stable at this time due to multiple maintenance medications and remaining compliant with therapies.  Due to severe symptoms, continues on tiny dose of alternating oral prednisone as needed.  Continues on Fasenra injections and would like to start receiving this at home.  Notes aggravation of symptoms due to current ongoing allergy season, but no severe acute worsening at this time.      Objective   Vital Signs:  /90   Pulse 89   Temp 97.2 °F (36.2 °C)   Ht 180.3 cm (70.98\")   Wt 113 kg (250 lb)   SpO2 96%   BMI 34.88 kg/m²   Estimated body mass index is 34.88 kg/m² as calculated from the following:    Height as of this encounter: 180.3 cm (70.98\").    Weight as of this encounter: 113 kg (250 lb).         Physical Exam  Vitals reviewed.   Constitutional:       General: He is not in acute distress.     Appearance: He is not diaphoretic.   HENT:      Head: Normocephalic and atraumatic.   Cardiovascular:      Rate and Rhythm: Normal rate and regular rhythm.   Pulmonary:      Effort: Pulmonary effort is normal.      Breath sounds: No wheezing, rhonchi or rales.   Neurological:      Mental Status: He is alert and oriented to person, place, and time.   Psychiatric:         Behavior: Behavior normal.        Result Review :    The following data was reviewed by: Vianey Duran PA-C on 05/13/2024:  Last chest x-ray report, 2022  PFT 2021       Assessment and Plan     Diagnoses and all orders for this visit:    1. Severe persistent asthma, unspecified whether complicated (Primary)        Severe asthma:   Continue albuterol inhaler as needed  Continue DuoNeb treatments as " needed  Continue Fasenra as scheduled  Will notify pharmacy that he would like to do Fasenra at home (can send to local pharmacy for pickup if possible or mail to him).  Currently on Trelegy 200 mcg daily   Gave sample of 100 mcg - if tolerated can will cut down.  Will go ahead make changes with the pharmacy.  Continue oral prednisone 5 to 10 mg on alternating days for maintenance.  Continue singular tablet nightly.  No urgent need for PFT at this time.    Can reconsider in the future as needed.,         Follow Up     Return in about 3 months (around 8/13/2024), or if symptoms worsen or fail to improve, for Next scheduled follow up.  Patient was given instructions and counseling regarding his condition or for health maintenance advice. Please see specific information pulled into the AVS if appropriate.

## 2024-08-13 ENCOUNTER — OFFICE VISIT (OUTPATIENT)
Dept: PULMONOLOGY | Facility: CLINIC | Age: 44
End: 2024-08-13
Payer: COMMERCIAL

## 2024-08-13 VITALS
SYSTOLIC BLOOD PRESSURE: 140 MMHG | WEIGHT: 250 LBS | HEART RATE: 92 BPM | DIASTOLIC BLOOD PRESSURE: 76 MMHG | HEIGHT: 71 IN | TEMPERATURE: 97.5 F | BODY MASS INDEX: 35 KG/M2 | OXYGEN SATURATION: 97 %

## 2024-08-13 DIAGNOSIS — J45.50 SEVERE PERSISTENT ASTHMA, UNSPECIFIED WHETHER COMPLICATED: Primary | ICD-10-CM

## 2024-08-13 PROCEDURE — 1159F MED LIST DOCD IN RCRD: CPT | Performed by: PHYSICIAN ASSISTANT

## 2024-08-13 PROCEDURE — 1160F RVW MEDS BY RX/DR IN RCRD: CPT | Performed by: PHYSICIAN ASSISTANT

## 2024-08-13 PROCEDURE — 99213 OFFICE O/P EST LOW 20 MIN: CPT | Performed by: PHYSICIAN ASSISTANT

## 2024-08-13 NOTE — PROGRESS NOTES
"Chief Complaint  Severe persistent asthma, unspecified whether complicated    Subjective        Lan Zheng presents to [facility] PULMONARY & CRITICAL CARE MEDICINE  History of Present Illness    Patient presents today for follow-up of severe asthma.  States that he had to travel out of state a lot recently, and was not able to keep up with the Tezspire injections.  He is interested in resuming these as symptoms have declined while on inhaler therapy and maintenance oral steroid alone.  Has still required to take these on occasion, but had been able to ultimately discontinue while on Tezspire.    Former smoking history noted--but does not qualify for LDCT screenings due to age, years since cessation.      Objective   Vital Signs:  /76   Pulse 92   Temp 97.5 °F (36.4 °C)   Ht 180.3 cm (70.98\")   Wt 113 kg (250 lb)   SpO2 97%   BMI 34.88 kg/m²   Estimated body mass index is 34.88 kg/m² as calculated from the following:    Height as of this encounter: 180.3 cm (70.98\").    Weight as of this encounter: 113 kg (250 lb).      Physical Exam  Vitals reviewed.   Constitutional:       General: He is not in acute distress.     Appearance: He is not diaphoretic.   HENT:      Head: Normocephalic and atraumatic.   Cardiovascular:      Rate and Rhythm: Normal rate and regular rhythm.   Pulmonary:      Effort: Pulmonary effort is normal.      Breath sounds: Wheezing (Occasional, brief) present. No rhonchi or rales.   Neurological:      Mental Status: He is alert and oriented to person, place, and time.   Psychiatric:         Behavior: Behavior normal.        Result Review :  The following data was reviewed by: Vianey Duran PA-C on 08/13/2024:    PFT 2021            Assessment and Plan   Diagnoses and all orders for this visit:    1. Severe persistent asthma, unspecified whether complicated (Primary)          Severe asthma:   Continue albuterol inhaler as needed  Continue YossioNebs as " needed  Continue Trelegy Ellipta 200 as scheduled  Continue singular tablet nightly.   Has oral prednisone to take 5 to 10 mg as needed.   Goal is to minimize use is much as possible.    Will restart Tezspire  Biologic therapy was very effective for symptomatic control and reducing flareups, but had to discontinue recently due to frequent travel out of state  Would like to resume use.   Would like to self administer at home   Consider repeat PFT in the near future.        Follow Up   Return in about 3 months (around 11/13/2024), or if symptoms worsen or fail to improve, for Next scheduled follow up.  Patient was given instructions and counseling regarding his condition or for health maintenance advice. Please see specific information pulled into the AVS if appropriate.

## 2024-11-14 ENCOUNTER — OFFICE VISIT (OUTPATIENT)
Dept: PULMONOLOGY | Facility: CLINIC | Age: 44
End: 2024-11-14
Payer: COMMERCIAL

## 2024-11-14 VITALS
HEIGHT: 71 IN | DIASTOLIC BLOOD PRESSURE: 88 MMHG | SYSTOLIC BLOOD PRESSURE: 128 MMHG | BODY MASS INDEX: 34.41 KG/M2 | OXYGEN SATURATION: 97 % | TEMPERATURE: 98.3 F | WEIGHT: 245.8 LBS | HEART RATE: 89 BPM

## 2024-11-14 DIAGNOSIS — J45.51 SEVERE PERSISTENT ASTHMA WITH ACUTE EXACERBATION: ICD-10-CM

## 2024-11-14 RX ORDER — ALBUTEROL SULFATE 90 UG/1
2 INHALANT RESPIRATORY (INHALATION) EVERY 4 HOURS PRN
Qty: 18 G | Refills: 11 | Status: SHIPPED | OUTPATIENT
Start: 2024-11-14

## 2024-11-14 RX ORDER — FLUTICASONE FUROATE, UMECLIDINIUM BROMIDE AND VILANTEROL TRIFENATATE 200; 62.5; 25 UG/1; UG/1; UG/1
POWDER RESPIRATORY (INHALATION)
COMMUNITY
Start: 2024-11-04

## 2024-11-14 NOTE — PROGRESS NOTES
"Chief Complaint  Severe persistent asthma, unspecified whether complicated    Subjective        Lan Zheng presents to [facility] PULMONARY & CRITICAL CARE MEDICINE  History of Present Illness    Patient presents today for follow-up.  Still continues on current maintenance therapies him intermittent use of low-dose prednisone.  Had plan to start back Tezspire, but had not been able to schedule this yet.  This seemed to significantly help symptoms.    Notable for former smoking history of approximately 15 years use and cessation in 2007.  Objective   Vital Signs:  /88   Pulse 89   Temp 98.3 °F (36.8 °C)   Ht 180.3 cm (71\")   Wt 111 kg (245 lb 12.8 oz)   SpO2 97%   BMI 34.28 kg/m²   Estimated body mass index is 34.28 kg/m² as calculated from the following:    Height as of this encounter: 180.3 cm (71\").    Weight as of this encounter: 111 kg (245 lb 12.8 oz).      Physical Exam   Result Review :  The following data was reviewed by: Vianey Duran PA-C on 11/14/2024:    Last chest x-ray report.  ---------------------------------------------------------------------------------    Last PFT report.    -----------------------------------------------------------------------------------        Assessment and Plan   Diagnoses and all orders for this visit:    1. Severe persistent asthma with acute exacerbation  -     albuterol sulfate  (90 Base) MCG/ACT inhaler; Inhale 2 puffs Every 4 (Four) Hours As Needed for Wheezing.  Dispense: 18 g; Refill: 11  -     Tezepelumab-ekko (TEZSPIRE) injection 210 mg      Severe asthma:  Continue albuterol inhaler as needed  Continue DuoNebs as needed  Continue Trelegy Ellipta 200  Continues with intermittent use of 5 to 10 mg prednisone for maintenance.  Plan to restart Tezspire, very beneficial but had not been able to schedule this yet.  Provided full dose sample and option today to restart therapy via autopen  Was trained on Autopen usage  Next dose " will be mailed to him by the pharmacy for at home administration.        Follow Up   Return in about 4 months (around 3/14/2025), or if symptoms worsen or fail to improve, for Next scheduled follow up.  Patient was given instructions and counseling regarding his condition or for health maintenance advice. Please see specific information pulled into the AVS if appropriate.

## 2024-11-15 ENCOUNTER — SPECIALTY PHARMACY (OUTPATIENT)
Dept: PHARMACY | Facility: HOSPITAL | Age: 44
End: 2024-11-15
Payer: COMMERCIAL

## 2024-11-25 DIAGNOSIS — J45.51 SEVERE PERSISTENT ASTHMA WITH ACUTE EXACERBATION: ICD-10-CM

## 2024-11-25 RX ORDER — PREDNISONE 5 MG/1
5-10 TABLET ORAL DAILY PRN
Qty: 60 TABLET | Refills: 3 | Status: SHIPPED | OUTPATIENT
Start: 2024-11-25

## 2024-12-09 ENCOUNTER — SPECIALTY PHARMACY (OUTPATIENT)
Dept: PHARMACY | Facility: HOSPITAL | Age: 44
End: 2024-12-09
Payer: COMMERCIAL

## 2024-12-09 RX ORDER — TEZEPELUMAB-EKKO 210 MG/1.9ML
210 INJECTION, SOLUTION SUBCUTANEOUS
Qty: 1.91 ML | Refills: 5 | Status: SHIPPED | OUTPATIENT
Start: 2024-12-09

## 2024-12-09 NOTE — PROGRESS NOTES
Specialty Pharmacy Patient Management Program  Asthma Initial Assessment     Lan Zheng was referred by Vianey Duran to the Patient Management program offered by [facility] for Asthma Management.  An initial outreach was conducted, including assessment of therapy appropriateness and specialty medication education for Tezspire. The patient was introduced to services offered by [facility], including: regular assessments, refill coordination, curbside pick-up or mail order delivery options, prior authorization maintenance, and financial assistance programs as applicable. The patient was also provided with contact information for the pharmacy team.     The patient's current asthma regimen includes: Trelegy, Tezspire (started on 11/14/24 at Vianey Monsalve's office, also had been previously on Tezspire in the past) and PRN albuterol, Combivent and duonebs.  Pt reports good adherence to maintenance regimen. Pt reports he is not a smoker or exposed to second hand smoke.     Lan Zheng reports asthma kept them from getting as much done a little of the time and having shortness of breath once a twice a week.   Pt reports nighttime awakening 2-3 nights a week due to asthma symptoms and using a rescue inhaler 1-2 times per day.  Pt self rates asthma control as Well Controlled.         Insurance Coverage & Financial Support  Medicaid     Relevant Past Medical History and Comorbidities  Relevant medical history and concomitant health conditions were discussed with the patient. The patient's chart has been reviewed for relevant past medical history and comorbid conditions and updated as necessary.  Past Medical History:   Diagnosis Date    Asthma     Chronic back pain     pinched nerve and slipped disc    COPD (chronic obstructive pulmonary disease)      Social History     Socioeconomic History    Marital status:    Tobacco Use    Smoking  "status: Former     Current packs/day: 0.00     Average packs/day: 3.0 packs/day for 15.7 years (47.2 ttl pk-yrs)     Types: Cigarettes     Start date:      Quit date: 10/2007     Years since quittin.2     Passive exposure: Past    Smokeless tobacco: Never   Vaping Use    Vaping status: Never Used   Substance and Sexual Activity    Alcohol use: No    Drug use: No    Sexual activity: Defer       Problem list reviewed by Dorothy Torres PharmD on 2024 at 10:50 AM    Allergies  Known allergies and reactions were discussed with the patient. The patient's chart has been reviewed for  allergy information and updated as necessary.   No Known Allergies    Allergies reviewed by Dorothy Torres PharmD on 2024 at 10:49 AM    Relevant Laboratory Values  Relevant laboratory values were discussed with the patient.   No results found for: \"GLUCOSE\", \"CALCIUM\", \"NA\", \"K\", \"CO2\", \"CL\", \"BUN\", \"CREATININE\", \"EGFRIFAFRI\", \"EGFRIFNONA\", \"BCR\", \"ANIONGAP\"    Asthma Control Test Results:   0-15 Very Poorly Controlled Asthma   15-20 Poorly Controlled Asthma  20-25 Well Controlled Asthma     Date  7/22/22 1/11/23 6/28/23 12/21/23 3/19/24     ACT Results 11 14 13 13 11     ACT Results Very Poorly  Controlled Asthma  Very Poorly Controlled Asthma  Very Poorly Controlled Asthma  Very Poorly Controlled Asthma  Very Poorly Controlled Asthma      **After starting on 24  Date  24         ACT Results 16         ACT Results Poorly Controlled Asthma          Vaccination Status   (LIVE vaccines not recommend with Tezspire and Dupixent)  Influenza: Reports that he does not usually get  Pneumococcal: Needs, declines    Current Medication List  This medication list has been reviewed with the patient and evaluated for any interactions or necessary modifications/recommendations, and updated to include all prescription medications, OTC medications, and supplements the patient is currently taking.  This list reflects what is " contained in the patient's profile, which has also been marked as reviewed to communicate to other providers it is the most up to date version of the patient's current medication therapy.     Current Outpatient Medications:     albuterol sulfate  (90 Base) MCG/ACT inhaler, Inhale 2 puffs Every 4 (Four) Hours As Needed for Wheezing., Disp: 18 g, Rfl: 11    amLODIPine (NORVASC) 5 MG tablet, Take 1 tablet by mouth Daily., Disp: , Rfl:     fluticasone (FLONASE) 50 MCG/ACT nasal spray, Administer 1 spray into the nostril(s) as directed by provider Daily., Disp: , Rfl:     guaiFENesin (Mucinex) 600 MG 12 hr tablet, Take 1 tablet by mouth 2 (Two) Times a Day As Needed for Cough or Congestion., Disp: 28 tablet, Rfl: 3    HYDROcodone-acetaminophen (NORCO) 7.5-325 MG per tablet, Take 1 tablet by mouth Every 8 (Eight) Hours As Needed., Disp: , Rfl:     ibuprofen (ADVIL,MOTRIN) 200 MG tablet, Take 1 tablet by mouth Every 6 (Six) Hours As Needed for Mild Pain., Disp: , Rfl:     ipratropium-albuterol (Combivent Respimat)  MCG/ACT inhaler, Inhale 1 puff Every 6 (Six) Hours As Needed for Wheezing or Shortness of Air., Disp: 4 g, Rfl: 8    ipratropium-albuterol (DUO-NEB) 0.5-2.5 mg/3 ml nebulizer, Take 3 mL by nebulization 4 (Four) Times a Day As Needed for Wheezing or Shortness of Air., Disp: 540 mL, Rfl: 8    loratadine (CLARITIN) 10 MG tablet, Take 1 tablet by mouth Daily., Disp: 30 tablet, Rfl: 6    montelukast (SINGULAIR) 10 MG tablet, Take 1 tablet by mouth every night at bedtime., Disp: 30 tablet, Rfl: 8    orphenadrine (NORFLEX) 100 MG 12 hr tablet, Take 1 tablet by mouth 2 (Two) Times a Day As Needed., Disp: , Rfl:     predniSONE (DELTASONE) 5 MG tablet, Take 1-2 tablets by mouth Daily As Needed (asthma maintenance.). Can reduce dosing down as tolerated., Disp: 60 tablet, Rfl: 3    Tezepelumab-ekko (Tezspire) 210 MG/1.91ML solution auto-injector, Inject 1.91 mL under the skin into the appropriate area as  directed Every 28 (Twenty-Eight) Days., Disp: 1.91 mL, Rfl: 5    Trelegy Ellipta 200-62.5-25 MCG/ACT inhaler, 1 PUFF BY MOUTH EVERY DAY, Disp: , Rfl:   No current facility-administered medications for this visit.    Medicines reviewed by Dorothy Torres, PharmD on 12/9/2024 at 10:49 AM    Drug Interactions  None with Tezspire    Initial Education Provided for Specialty Medication  The patient has been provided with the following education and any applicable administration techniques (i.e. self-injection) have been demonstrated for the therapies indicated. All questions and concerns have been addressed prior to the patient receiving the medication, and the patient has verbalized comprehension of the education and any materials provided. Additional patient education shall be provided and documented upon request by the patient, provider, or payer.    Tezspire (tezepelumab-ekko)  Medication Expectations   Why am I taking this medication? You are taking Tezspire because you have severe asthma.    What should I expect while on this medication? You should expect a significant improvement in asthma symptom control and quality of life, including reduction in the number of exacerbations and their severity.   How does the medication work? Tezspire works by blocking a protien in your lungs called TSLP. TSLP is released when the airways in your lungs are exposed to asthma triggers, and it causes a chain reaction of inflammation that can lead to the symptoms you experience during asthma attacks.  By targeting TSLP, Tezspire helps to stop this chain reaction before it starts to reduce inflammation and help prevent asthma attacks.   How long will I be on this medication for? The amount of time you will be on this medication will be decided by your provider and how you respond to therapy.    How do I take this medication? This is a subcutaneous injection that can either be administered in your physician's office or for at-home  administration.  The pre-filled syringe must be administered in the physician's office.  The pre-filled pen is the only dose that is available for home use, and it is taken every 4 weeks.   If you are giving yourself the injection, the recommended injection site is the front of your thigh or the lower part of your stomach (abdomen). Rotate these injection sites, and do not inject yourself in the arm.  Do not inject into skin that is tender, damaged, bruised, or scarred.    What are some possible side effects? The most common side effects of Tezspire include sore throat, joint pain, injection site reactions, and back pain.   What happens if I miss a dose? If a dose is missed, administer the dose as soon as possible.            Medication Safety   What are things I should warn my doctor immediately about? Hypersensitivity reactions, acute asthma symptoms or worsening disease, if you have a parasitic infection, recently had any live vaccines, or are pregnant or plan to become pregnant, or are breastfeeding.   What are things that I should be cautious of? Injection site reactions or any of the side effects mentioned above.   What are some medications that can interact with this one? No formal drug interactions studies have been performed with Teszpire at this time.            Medication Storage/Handling   How should I handle this medication? Keep this medication our of reach of pets/children in original container. Wash your hands before and after use.    How does this medication need to be stored? Store refrigerated and upright, protect from light, and do not freeze or shake. If necessary, Tezspire may be kept at room temperature between for a maximum of 30 days.    How should I dispose of this medication? Put your used pre-filled pen and cap in a sharps disposal container right away after use. Put other used supplies in your household trash, but do not throw away the pre-filled pen in your household trash.     If you  do not have a FDA-cleared sharps disposal container, you may use a household container that is:   made of a heavy-duty plastic,   can be closed with a tight-fitting puncture-resistant lid   upright and stable during use   leak-resistant, and properly labeled as hazardous waste / sharps            Resources/Support   How can I remind myself to take this medication? You can download a reminder tung on your phone or use a calandar  to help with your injection schedule.    Is financial support available?  Tezspire copay assistance is available through [employer] program. Your pharmacist can talk to you about financial assistance you qualify for.    Which vaccines are recommended for me? Talk to your doctor before you schedule any vaccines, including live vaccines.            Adherence, Self-Administration, and Current Therapy Problems  Adherence related to the patient's specialty therapy was discussed with the patient. The Adherence segment of this outreach has been reviewed and updated.     Is there a concern with patient's ability to self administer the medication correctly and without issue?: No  Were any potential barriers to adherence identified during the initial assessment or patient education?: No  Are there any concerns regarding the patient's understanding of the importance of medication adherence?: No  Methods for Supporting Patient Adherence and/or Self-Administration: See plan if applicable    Open Medication Therapy Problems  No medication therapy recommendations to display    Goals of Therapy  Goals related to the patient's specialty therapy were discussed with the patient. The Patient Goals segment of this outreach has been reviewed and updated.   Goals Addressed Today        Specialty Pharmacy General Goal      Reduce asthma exacerbations to <4 per month            Reassessment Plan & Follow-Up  1. Medication Therapy Changes: Patient will begin giving Tezspire 210mg SQ every 28 days at home  (1st dose was administered at Vianey Monsalve's office on 11/14/24- patient was trained on autoinjector at that appointment as well).   Tezspire Prefilled Pen (auto-injector) 210 mg SubQ every 4 weeks for severe asthma. Medication education and counseling provided below.    Patient is comfortable giving their next injection at home.  2. Related Plans, Therapy Recommendations, or Therapy Problems to Be Addressed: N/A  3. Recommended vaccinations- Flu and Pneumonia shot- patient declined.   4. Patient will continue regular follow-up with pulmonology- next appointment on 3/14/25.   5. Patient will follow-up with specialty mail out services.  Care Coordinator to set up future refill outreaches, coordinate prescription delivery, and escalate clinical questions to pharmacist.  6. Will follow-up in 6 months, or sooner if needed. Pharmacist to perform regular assessments no more than (6) months from the previous assessment.   7. Welcome information and patient satisfaction survey to be sent by specialty pharmacy team with patient's initial fill.    Attestation  Therapeutic appropriateness: Appropriate   I attest the patient was actively involved in and has agreed to the above plan of care. If the prescribed therapy is at any point deemed not appropriate based on the current or future assessments, a consultation will be initiated with the patient's specialty care provider to determine the best course of action. The revised plan of therapy will be documented along with any required assessments and/or additional patient education provided.     Dorothy Torres, PharmD  12/9/2024  10:56 EST

## 2025-01-21 ENCOUNTER — SPECIALTY PHARMACY (OUTPATIENT)
Dept: PHARMACY | Facility: HOSPITAL | Age: 45
End: 2025-01-21
Payer: COMMERCIAL

## 2025-01-21 NOTE — PROGRESS NOTES
Specialty Pharmacy Refill Coordination Note     Lan is a 44 y.o. male contacted today regarding refills of Tezspire specialty medication(s).    Reviewed and verified with patient: yes  Specialty medication(s) and dose(s) confirmed: yes    Refill Questions      Flowsheet Row Most Recent Value   Changes to allergies? No   Changes to medications? No   New conditions or infections since last clinic visit No   Unplanned office visit, urgent care, ED, or hospital admission in the last 4 weeks  No   How does patient/caregiver feel medication is working? Very good   Financial problems or insurance changes  No   Since the previous refill, were any specialty medication doses or scheduled injections missed or delayed?  No   Does this patient require a clinical escalation to a pharmacist? No            Delivery Questions      Flowsheet Row Most Recent Value   Delivery method UPS   Delivery address verified with patient/caregiver? Yes   Delivery address Home   Number of medications in delivery 1   Medication(s) being filled and delivered Tezepelumab-ekko (Tezspire)   Doses left of specialty medications 0   Copay verified? Yes   Copay amount $0   Copay form of payment No copayment ($0)   Ship Date 1/22/25   Delivery Date Selection 01/23/25              Medication Adherence    Adherence tools used: directed education  Support network for adherence: healthcare provider          Follow-up: 28 day(s)     Cassidy Barajas, Pharmacy Technician  Specialty Pharmacy Technician

## 2025-02-25 ENCOUNTER — SPECIALTY PHARMACY (OUTPATIENT)
Dept: PHARMACY | Facility: HOSPITAL | Age: 45
End: 2025-02-25
Payer: COMMERCIAL

## 2025-02-25 NOTE — PROGRESS NOTES
Specialty Pharmacy Patient Management Program  Refill Outreach     Lan was contacted today regarding refills of their medication(s) Tezspire    Refill Questions      Flowsheet Row Most Recent Value   Changes to allergies? No   Changes to medications? No   New conditions or infections since last clinic visit No   Unplanned office visit, urgent care, ED, or hospital admission in the last 4 weeks  No   How does patient/caregiver feel medication is working? Very good   Financial problems or insurance changes  No   Since the previous refill, were any specialty medication doses or scheduled injections missed or delayed?  No   Does this patient require a clinical escalation to a pharmacist? No            Delivery Questions      Flowsheet Row Most Recent Value   Delivery method UPS   Delivery address verified with patient/caregiver? Yes   Delivery address Home   Number of medications in delivery 1   Medication(s) being filled and delivered Tezepelumab-ekko (Tezspire)   Doses left of specialty medications 0   Copay verified? Yes   Copay amount $0   Copay form of payment No copayment ($0)   Delivery Date Selection 02/27/25            Medication Adherence    Adherence tools used: directed education  Support network for adherence: healthcare provider          Follow-up: 28 day(s)     Cassidy Barajas, Pharmacy Technician  2/25/2025  13:13 EST

## 2025-03-31 ENCOUNTER — OFFICE VISIT (OUTPATIENT)
Dept: PULMONOLOGY | Facility: CLINIC | Age: 45
End: 2025-03-31
Payer: COMMERCIAL

## 2025-03-31 VITALS
HEART RATE: 74 BPM | SYSTOLIC BLOOD PRESSURE: 124 MMHG | TEMPERATURE: 96.9 F | BODY MASS INDEX: 33.85 KG/M2 | HEIGHT: 71 IN | OXYGEN SATURATION: 98 % | WEIGHT: 241.8 LBS | DIASTOLIC BLOOD PRESSURE: 78 MMHG

## 2025-03-31 DIAGNOSIS — J45.50 SEVERE PERSISTENT ASTHMA, UNSPECIFIED WHETHER COMPLICATED: Primary | ICD-10-CM

## 2025-03-31 PROCEDURE — 99213 OFFICE O/P EST LOW 20 MIN: CPT | Performed by: PHYSICIAN ASSISTANT

## 2025-03-31 PROCEDURE — 1160F RVW MEDS BY RX/DR IN RCRD: CPT | Performed by: PHYSICIAN ASSISTANT

## 2025-03-31 PROCEDURE — 1159F MED LIST DOCD IN RCRD: CPT | Performed by: PHYSICIAN ASSISTANT

## 2025-03-31 NOTE — PROGRESS NOTES
"Chief Complaint  Severe asthma        Subjective        Lan Zheng presents to [facility] PULMONARY & CRITICAL CARE MEDICINE  History of Present Illness    Mr. Zheng presents today for follow up. Still feels that symptoms remain stable on current therapy. Recalls that biologic therapy has seemed to make the biggest impact in his asthma symptoms.  Notes less frequent symptom fluctuation and less severity of flareups.      Objective   Vital Signs:  /78   Pulse 74   Temp 96.9 °F (36.1 °C)   Ht 180.3 cm (70.98\")   Wt 110 kg (241 lb 12.8 oz)   SpO2 98%   BMI 33.74 kg/m²   Estimated body mass index is 33.74 kg/m² as calculated from the following:    Height as of this encounter: 180.3 cm (70.98\").    Weight as of this encounter: 110 kg (241 lb 12.8 oz).        Physical Exam  Vitals reviewed.   Constitutional:       General: He is not in acute distress.  Cardiovascular:      Rate and Rhythm: Normal rate and regular rhythm.   Pulmonary:      Effort: Pulmonary effort is normal.      Breath sounds: No wheezing, rhonchi or rales.   Neurological:      Mental Status: He is alert and oriented to person, place, and time.   Psychiatric:         Behavior: Behavior normal.        Result Review :  The following data was reviewed by: Vianey Duran PA-C on 03/31/2025:      PFT 2021    Flow volume loop was assessed.  Spirometry showed no obstruction.  There is significant bronchodilator response noted.  Diffusing capacity, uncorrected for hemoglobin, is normal.  Lung volumes are normal.          -----------------------------------------------------------------------------------    Chest x-ray report 2022      -----------------------------------------------------------------------------------    Previous labs reviewed     Latest Reference Range & Units 11/02/21 15:46   WBC 3.40 - 10.80 10*3/mm3 9.73   RBC 4.14 - 5.80 10*6/mm3 5.32   Hemoglobin 13.0 - 17.7 g/dL 16.4   Hematocrit 37.5 - 51.0 % 48.0 "   Platelets 140 - 450 10*3/mm3 310   RDW 12.3 - 15.4 % 12.6   MCV 79.0 - 97.0 fL 90.2   MCH 26.6 - 33.0 pg 30.8   MCHC 31.5 - 35.7 g/dL 34.2   MPV 6.0 - 12.0 fL 10.3   RDW-SD 37.0 - 54.0 fl 41.4   Neutrophil Rel % 42.7 - 76.0 % 58.7   Lymphocyte Rel % 19.6 - 45.3 % 24.0   Monocyte Rel % 5.0 - 12.0 % 7.3   Eosinophil Rel % 0.3 - 6.2 % 7.4 (H)   Basophil Rel % 0.0 - 1.5 % 0.9   Immature Granulocyte Rel % 0.0 - 0.5 % 1.7 (H)   Neutrophils Absolute 1.70 - 7.00 10*3/mm3 5.70   Lymphocytes Absolute 0.70 - 3.10 10*3/mm3 2.34   Monocytes Absolute 0.10 - 0.90 10*3/mm3 0.71   Eosinophils Absolute 0.00 - 0.40 10*3/mm3 0.72 (H)   Basophils Absolute 0.00 - 0.20 10*3/mm3 0.09   Immature Grans, Absolute 0.00 - 0.05 10*3/mm3 0.17 (H)   nRBC 0.0 - 0.2 /100 WBC 0.0   IgE 6 - 495 IU/mL 371   (H): Data is abnormally high        -----------------------------------------------------------------------------------        Assessment and Plan   Diagnoses and all orders for this visit:    1. Severe persistent asthma, unspecified whether complicated (Primary)        Severe asthma:   Continue albuterol inhaler as needed  Continue duonebs as needed  Continue Trelegy ellipta 200 mcg  Continue Singulair 10 mg nightly  Continue 5-10 mg prednisone daily PRN for maintenance  Not required often while using Tezspire.  Has been able to resume Tezspire regularly.  Now mailed to his house.   Notes significant symptom maintenance with use  Only had lapse in use due to contact        Follow Up   Return in about 4 months (around 7/31/2025), or if symptoms worsen or fail to improve, for Recheck.  Patient was given instructions and counseling regarding his condition or for health maintenance advice. Please see specific information pulled into the AVS if appropriate.

## 2025-04-07 ENCOUNTER — SPECIALTY PHARMACY (OUTPATIENT)
Dept: PHARMACY | Facility: HOSPITAL | Age: 45
End: 2025-04-07
Payer: COMMERCIAL

## 2025-04-07 NOTE — PROGRESS NOTES
Specialty Pharmacy Patient Management Program  Refill Outreach     Lan was contacted today regarding refills of their medication(s) Tezspire    Refill Questions      Flowsheet Row Most Recent Value   Changes to allergies? No   Changes to medications? No   New conditions or infections since last clinic visit No   Unplanned office visit, urgent care, ED, or hospital admission in the last 4 weeks  No   How does patient/caregiver feel medication is working? Very good   Financial problems or insurance changes  No   Since the previous refill, were any specialty medication doses or scheduled injections missed or delayed?  No   Does this patient require a clinical escalation to a pharmacist? No            Delivery Questions      Flowsheet Row Most Recent Value   Delivery method UPS   Delivery address verified with patient/caregiver? Yes   Delivery address Home   Number of medications in delivery 1   Medication(s) being filled and delivered Tezepelumab-ekko (Tezspire)   Doses left of specialty medications 0   Copay verified? Yes   Copay amount $0   Copay form of payment No copayment ($0)   Delivery Date Selection 04/09/25            Medication Adherence    Adherence tools used: directed education  Support network for adherence: healthcare provider          Follow-up: 28 day(s)     Cassidy Barajas, Pharmacy Technician  4/7/2025  15:39 EDT

## 2025-04-19 ENCOUNTER — DOCUMENTATION (OUTPATIENT)
Dept: PULMONOLOGY | Facility: CLINIC | Age: 45
End: 2025-04-19
Payer: COMMERCIAL

## 2025-04-19 NOTE — PROGRESS NOTES
Alpha-1 genotype completed at the last visit,  Genotype was normal, MM.  Mailed result note letter

## 2025-05-07 ENCOUNTER — SPECIALTY PHARMACY (OUTPATIENT)
Dept: PHARMACY | Facility: HOSPITAL | Age: 45
End: 2025-05-07
Payer: COMMERCIAL

## 2025-05-07 NOTE — PROGRESS NOTES
Specialty Pharmacy Patient Management Program  Refill Outreach     Lan was contacted today regarding refills of their medication(s) Tezspire    Refill Questions      Flowsheet Row Most Recent Value   Changes to allergies? No   Changes to medications? No   New conditions or infections since last clinic visit No   Unplanned office visit, urgent care, ED, or hospital admission in the last 4 weeks  No   How does patient/caregiver feel medication is working? Very good   Financial problems or insurance changes  No   Since the previous refill, were any specialty medication doses or scheduled injections missed or delayed?  No   Does this patient require a clinical escalation to a pharmacist? No            Delivery Questions      Flowsheet Row Most Recent Value   Delivery method UPS   Delivery address verified with patient/caregiver? Yes   Delivery address Home   Number of medications in delivery 1   Medication(s) being filled and delivered Tezepelumab-ekko (Tezspire)   Doses left of specialty medications 0   Copay verified? Yes   Copay amount $0   Copay form of payment No copayment ($0)   Delivery Date Selection 05/09/25            Medication Adherence    Adherence tools used: directed education  Support network for adherence: healthcare provider          Follow-up: 28 day(s)     Cassidy Barajas, Pharmacy Technician  5/7/2025  15:25 EDT

## 2025-05-27 ENCOUNTER — OFFICE VISIT (OUTPATIENT)
Dept: PULMONOLOGY | Facility: CLINIC | Age: 45
End: 2025-05-27
Payer: COMMERCIAL

## 2025-05-27 VITALS
HEIGHT: 71 IN | SYSTOLIC BLOOD PRESSURE: 138 MMHG | DIASTOLIC BLOOD PRESSURE: 76 MMHG | TEMPERATURE: 97.6 F | HEART RATE: 83 BPM | BODY MASS INDEX: 33.68 KG/M2 | OXYGEN SATURATION: 100 % | WEIGHT: 240.6 LBS

## 2025-05-27 DIAGNOSIS — J45.50 SEVERE PERSISTENT ASTHMA, UNSPECIFIED WHETHER COMPLICATED: Primary | ICD-10-CM

## 2025-05-27 PROCEDURE — 1159F MED LIST DOCD IN RCRD: CPT | Performed by: PHYSICIAN ASSISTANT

## 2025-05-27 PROCEDURE — 1160F RVW MEDS BY RX/DR IN RCRD: CPT | Performed by: PHYSICIAN ASSISTANT

## 2025-05-27 PROCEDURE — 99213 OFFICE O/P EST LOW 20 MIN: CPT | Performed by: PHYSICIAN ASSISTANT

## 2025-05-27 NOTE — PROGRESS NOTES
"Chief Complaint  Severe persistent asthma, unspecified whether complicated    Subjective        Lan Zheng presents to [facility]  History of Present Illness    Patient presents today for follow-up of severe asthma.  He is notable for overall stability at this time.  He can still have some symptom fluctuations, but through current medications and ability to avoid some triggers, symptoms have remained more stable than in the past.  He previously worked in construction.        Objective   Vital Signs:  /76   Pulse 83   Temp 97.6 °F (36.4 °C)   Ht 180.3 cm (70.98\")   Wt 109 kg (240 lb 9.6 oz)   SpO2 100%   BMI 33.57 kg/m²   Estimated body mass index is 33.57 kg/m² as calculated from the following:    Height as of this encounter: 180.3 cm (70.98\").    Weight as of this encounter: 109 kg (240 lb 9.6 oz).      Physical Exam  Vitals reviewed.   Constitutional:       General: He is not in acute distress.  Cardiovascular:      Rate and Rhythm: Normal rate and regular rhythm.   Pulmonary:      Effort: Pulmonary effort is normal.      Breath sounds: No wheezing, rhonchi or rales.   Neurological:      Mental Status: He is alert and oriented to person, place, and time.   Psychiatric:         Behavior: Behavior normal.        Result Review :  The following data was reviewed by: Vianey Duran PA-C on 05/27/2025:       PFT 2021    Flow volume loop was assessed.  Spirometry showed no obstruction.  There is significant bronchodilator response noted.  Diffusing capacity, uncorrected for hemoglobin, is normal.  Lung volumes are normal.       -----------------------------------------------------------------------------------    Chest x-ray report 2022        -----------------------------------------------------------------------------------            Assessment and Plan   Diagnoses and all orders for this visit:    1. Severe persistent asthma, unspecified whether " complicated (Primary)            Severe asthma:   Symptoms were very significant in the past, now much better controlled overall with current therapies.  Can still have some symptom fluctuation at times but much improved compared to in the past.  Also has been able to avoid past occupational triggers.  Will consider new spirometry at the next visit.  Continue albuterol inhaler as needed  Continue DuoNebs as needed  Continue Trelegy Ellipta 200 mcg  Continue Singulair 10 mg nightly  Continue 5-10 mg prednisone daily PRN for maintenance  Infrequently required since using Tezspire  Continues Tezspire scheduled   mailed to his house.   Notes significant symptom maintenance with use        Follow Up   Return in about 4 months (around 9/27/2025), or if symptoms worsen or fail to improve, for Recheck.  Patient was given instructions and counseling regarding his condition or for health maintenance advice. Please see specific information pulled into the AVS if appropriate.

## 2025-06-06 ENCOUNTER — SPECIALTY PHARMACY (OUTPATIENT)
Dept: PHARMACY | Facility: HOSPITAL | Age: 45
End: 2025-06-06
Payer: COMMERCIAL

## 2025-06-06 NOTE — PROGRESS NOTES
Specialty Pharmacy Patient Management Program  Refill Outreach     Lan was contacted today regarding refills of their medication(s).    Refill Questions      Flowsheet Row Most Recent Value   Changes to allergies? No   Changes to medications? No   New conditions or infections since last clinic visit No   Unplanned office visit, urgent care, ED, or hospital admission in the last 4 weeks  No   How does patient/caregiver feel medication is working? Good   Financial problems or insurance changes  No   Since the previous refill, were any specialty medication doses or scheduled injections missed or delayed?  No   Does this patient require a clinical escalation to a pharmacist? No            Delivery Questions      Flowsheet Row Most Recent Value   Delivery method UPS   Delivery address verified with patient/caregiver? Yes   Delivery address Home   Medication(s) being filled and delivered Tezepelumab-ekko (Tezspire)   Copay verified? Yes   Copay amount $0   Copay form of payment No copayment ($0)   Delivery Date Selection 06/10/25   Signature Required Yes            Medication Adherence    Adherence tools used: directed education  Support network for adherence: healthcare provider          Follow-up: 28 day(s)     Lois Toney, Pharmacy Technician  6/6/2025  10:54 EDT

## 2025-06-16 NOTE — PROGRESS NOTES
Specialty Pharmacy Patient Management Program  Program Disenrollment      Lan Zheng is seen by their provider for Asthma management. Patient was previously enrolled in the Asthma Patient Management program offered by [facility].      Disenrolling patient today due to being unable to reach patient by phone or mail.    Jyoti Callaway, PharmD  6/16/2025  09:22 EDT

## 2025-07-07 ENCOUNTER — CLINICAL SUPPORT (OUTPATIENT)
Dept: PULMONOLOGY | Facility: CLINIC | Age: 45
End: 2025-07-07
Payer: COMMERCIAL

## 2025-07-07 DIAGNOSIS — J45.50 SEVERE PERSISTENT ASTHMA, UNSPECIFIED WHETHER COMPLICATED: Primary | ICD-10-CM

## 2025-07-09 DIAGNOSIS — J45.50 SEVERE PERSISTENT ASTHMA, UNSPECIFIED WHETHER COMPLICATED: Primary | ICD-10-CM

## 2025-07-10 ENCOUNTER — TELEPHONE (OUTPATIENT)
Dept: PHARMACY | Facility: HOSPITAL | Age: 45
End: 2025-07-10
Payer: COMMERCIAL

## 2025-07-10 ENCOUNTER — SPECIALTY PHARMACY (OUTPATIENT)
Dept: PHARMACY | Facility: HOSPITAL | Age: 45
End: 2025-07-10
Payer: COMMERCIAL

## 2025-07-10 RX ORDER — TEZEPELUMAB-EKKO 210 MG/1.9ML
210 INJECTION, SOLUTION SUBCUTANEOUS
Qty: 1.91 ML | Refills: 5 | Status: SHIPPED | OUTPATIENT
Start: 2025-07-10

## 2025-07-10 NOTE — PROGRESS NOTES
Specialty Pharmacy Patient Management Program  Asthma Initial Assessment     Lan Zheng was referred by Vianey Duran to the Patient Management program offered by [facility] for Asthma Management.  An initial outreach was conducted, including assessment of therapy appropriateness and specialty medication education for Tezspire. The patient was introduced to services offered by [facility], including: regular assessments, refill coordination, curbside pick-up or mail order delivery options, prior authorization maintenance, and financial assistance programs as applicable. The patient was also provided with contact information for the pharmacy team.     The patient's current asthma regimen includes: Trelegy, Tezspire (started on 11/14/24 at Vianey Monsalve's office, also had been previously on Tezspire in the past) and PRN albuterol, Combivent and duonebs.  Pt reports good adherence to maintenance regimen. Pt reports he is not a smoker or exposed to second hand smoke.     Lan Zheng reports asthma kept them from getting as much done a little of the time and having shortness of breath once a twice a week.   Pt reports nighttime awakening 2-3 nights a week due to asthma symptoms and using a rescue inhaler 1-2 times per day.  Pt self rates asthma control as Well Controlled.         The patient has been disenrolled from asthma program 3 times due to lack of communication and not returning clinic calls.    Insurance Coverage & Financial Support  Medicaid     Relevant Past Medical History and Comorbidities  Relevant medical history and concomitant health conditions were discussed with the patient. The patient's chart has been reviewed for relevant past medical history and comorbid conditions and updated as necessary.  Past Medical History:   Diagnosis Date    Asthma     Chronic back pain     pinched nerve and slipped disc    COPD (chronic  "obstructive pulmonary disease)      Social History     Socioeconomic History    Marital status:    Tobacco Use    Smoking status: Former     Current packs/day: 0.00     Average packs/day: 3.0 packs/day for 15.7 years (47.2 ttl pk-yrs)     Types: Cigarettes     Start date:      Quit date: 10/2007     Years since quittin.7     Passive exposure: Past    Smokeless tobacco: Never   Vaping Use    Vaping status: Never Used   Substance and Sexual Activity    Alcohol use: No    Drug use: No    Sexual activity: Defer       Problem list reviewed by Jyoti Callaway PharmD on 7/10/2025 at  2:45 PM    Allergies  Known allergies and reactions were discussed with the patient. The patient's chart has been reviewed for  allergy information and updated as necessary.   No Known Allergies    Allergies reviewed by Jyoti Callaway PharmD on 7/10/2025 at  2:45 PM    Relevant Laboratory Values  Relevant laboratory values were discussed with the patient.   No results found for: \"GLUCOSE\", \"CALCIUM\", \"NA\", \"K\", \"CO2\", \"CL\", \"BUN\", \"CREATININE\", \"EGFRIFAFRI\", \"EGFRIFNONA\", \"BCR\", \"ANIONGAP\"    Asthma Control Test Results:   0-15 Very Poorly Controlled Asthma   15-20 Poorly Controlled Asthma  20-25 Well Controlled Asthma     Date  7/22/22 1/11/23 6/28/23 12/21/23 3/19/24     ACT Results 11 14 13 13 11     ACT Results Very Poorly  Controlled Asthma  Very Poorly Controlled Asthma  Very Poorly Controlled Asthma  Very Poorly Controlled Asthma  Very Poorly Controlled Asthma      **After starting on 24  Date  24         ACT Results 16         ACT Results Poorly Controlled Asthma          Vaccination Status   (LIVE vaccines not recommend with Tezspire and Dupixent)  Influenza: Reports that he does not usually get  Pneumococcal: Needs, declines    Current Medication List  This medication list has been reviewed with the patient and evaluated for any interactions or necessary modifications/recommendations, and updated to " include all prescription medications, OTC medications, and supplements the patient is currently taking.  This list reflects what is contained in the patient's profile, which has also been marked as reviewed to communicate to other providers it is the most up to date version of the patient's current medication therapy.     Current Outpatient Medications:     albuterol sulfate  (90 Base) MCG/ACT inhaler, Inhale 2 puffs Every 4 (Four) Hours As Needed for Wheezing., Disp: 18 g, Rfl: 11    fluticasone (FLONASE) 50 MCG/ACT nasal spray, Administer 1 spray into the nostril(s) as directed by provider Daily., Disp: , Rfl:     guaiFENesin (Mucinex) 600 MG 12 hr tablet, Take 1 tablet by mouth 2 (Two) Times a Day As Needed for Cough or Congestion., Disp: 28 tablet, Rfl: 3    HYDROcodone-acetaminophen (NORCO) 7.5-325 MG per tablet, Take 1 tablet by mouth Every 8 (Eight) Hours As Needed., Disp: , Rfl:     ibuprofen (ADVIL,MOTRIN) 200 MG tablet, Take 1 tablet by mouth Every 6 (Six) Hours As Needed for Mild Pain., Disp: , Rfl:     ipratropium-albuterol (Combivent Respimat)  MCG/ACT inhaler, Inhale 1 puff Every 6 (Six) Hours As Needed for Wheezing or Shortness of Air., Disp: 4 g, Rfl: 8    ipratropium-albuterol (DUO-NEB) 0.5-2.5 mg/3 ml nebulizer, Take 3 mL by nebulization 4 (Four) Times a Day As Needed for Wheezing or Shortness of Air., Disp: 540 mL, Rfl: 8    loratadine (CLARITIN) 10 MG tablet, Take 1 tablet by mouth Daily., Disp: 30 tablet, Rfl: 6    montelukast (SINGULAIR) 10 MG tablet, Take 1 tablet by mouth every night at bedtime., Disp: 30 tablet, Rfl: 8    orphenadrine (NORFLEX) 100 MG 12 hr tablet, Take 1 tablet by mouth 2 (Two) Times a Day As Needed., Disp: , Rfl:     predniSONE (DELTASONE) 5 MG tablet, Take 1-2 tablets by mouth Daily As Needed (asthma maintenance.). Can reduce dosing down as tolerated., Disp: 60 tablet, Rfl: 3    Tezepelumab-ekko (Tezspire) 210 MG/1.91ML solution auto-injector, Inject 1.91 mL  under the skin into the appropriate area as directed Every 28 (Twenty-Eight) Days., Disp: 1.91 mL, Rfl: 5    Trelegy Ellipta 200-62.5-25 MCG/ACT inhaler, 1 PUFF BY MOUTH EVERY DAY, Disp: , Rfl:   No current facility-administered medications for this visit.    Medicines reviewed by Jyoti Callaway, PharmD on 7/10/2025 at  2:45 PM    Drug Interactions  None with Tezspire    Initial Education Provided for Specialty Medication  The patient has been provided with the following education and any applicable administration techniques (i.e. self-injection) have been demonstrated for the therapies indicated. All questions and concerns have been addressed prior to the patient receiving the medication, and the patient has verbalized comprehension of the education and any materials provided. Additional patient education shall be provided and documented upon request by the patient, provider, or payer.    Tezspire (tezepelumab-ekko)  Medication Expectations   Why am I taking this medication? You are taking Tezspire because you have severe asthma.    What should I expect while on this medication? You should expect a significant improvement in asthma symptom control and quality of life, including reduction in the number of exacerbations and their severity.   How does the medication work? Tezspire works by blocking a protien in your lungs called TSLP. TSLP is released when the airways in your lungs are exposed to asthma triggers, and it causes a chain reaction of inflammation that can lead to the symptoms you experience during asthma attacks.  By targeting TSLP, Tezspire helps to stop this chain reaction before it starts to reduce inflammation and help prevent asthma attacks.   How long will I be on this medication for? The amount of time you will be on this medication will be decided by your provider and how you respond to therapy.    How do I take this medication? This is a subcutaneous injection that can either be administered  in your physician's office or for at-home administration.  The pre-filled syringe must be administered in the physician's office.  The pre-filled pen is the only dose that is available for home use, and it is taken every 4 weeks.   If you are giving yourself the injection, the recommended injection site is the front of your thigh or the lower part of your stomach (abdomen). Rotate these injection sites, and do not inject yourself in the arm.  Do not inject into skin that is tender, damaged, bruised, or scarred.    What are some possible side effects? The most common side effects of Tezspire include sore throat, joint pain, injection site reactions, and back pain.   What happens if I miss a dose? If a dose is missed, administer the dose as soon as possible.            Medication Safety   What are things I should warn my doctor immediately about? Hypersensitivity reactions, acute asthma symptoms or worsening disease, if you have a parasitic infection, recently had any live vaccines, or are pregnant or plan to become pregnant, or are breastfeeding.   What are things that I should be cautious of? Injection site reactions or any of the side effects mentioned above.   What are some medications that can interact with this one? No formal drug interactions studies have been performed with Teszpire at this time.            Medication Storage/Handling   How should I handle this medication? Keep this medication our of reach of pets/children in original container. Wash your hands before and after use.    How does this medication need to be stored? Store refrigerated and upright, protect from light, and do not freeze or shake. If necessary, Tezspire may be kept at room temperature between for a maximum of 30 days.    How should I dispose of this medication? Put your used pre-filled pen and cap in a sharps disposal container right away after use. Put other used supplies in your household trash, but do not throw away the pre-filled  pen in your household trash.     If you do not have a FDA-cleared sharps disposal container, you may use a household container that is:   made of a heavy-duty plastic,   can be closed with a tight-fitting puncture-resistant lid   upright and stable during use   leak-resistant, and properly labeled as hazardous waste / sharps            Resources/Support   How can I remind myself to take this medication? You can download a reminder tung on your phone or use a calandar  to help with your injection schedule.    Is financial support available?  Troodon assistance is available through [employer]. Your pharmacist can talk to you about financial assistance you qualify for.    Which vaccines are recommended for me? Talk to your doctor before you schedule any vaccines, including live vaccines.            Adherence, Self-Administration, and Current Therapy Problems  Adherence related to the patient's specialty therapy was discussed with the patient. The Adherence segment of this outreach has been reviewed and updated.     Is there a concern with patient's ability to self administer the medication correctly and without issue?: No  Were any potential barriers to adherence identified during the initial assessment or patient education?: No  Are there any concerns regarding the patient's understanding of the importance of medication adherence?: No  Methods for Supporting Patient Adherence and/or Self-Administration: See plan if applicable    Open Medication Therapy Problems  No medication therapy recommendations to display    Goals of Therapy  Goals related to the patient's specialty therapy were discussed with the patient. The Patient Goals segment of this outreach has been reviewed and updated.   Goals Addressed Today        Specialty Pharmacy General Goal      Reduce asthma exacerbations to <4 per month    7/10/25 [place]: patient has been disenrolled from asthma clinic 3 times due to contact issues             Reassessment Plan & Follow-Up  1. Medication Therapy Changes: Patient will begin giving Tezspire 210mg SQ every 28 days at home   Tezspire Prefilled Pen (auto-injector) 210 mg SubQ every 4 weeks for severe asthma. Medication education and counseling provided below.    Patient is comfortable giving their next injection at home.  Patient received sample in clinic last week and is not due for next shipment until the beginning of August   2. Related Plans, Therapy Recommendations, or Therapy Problems to Be Addressed: N/A  3. Recommended vaccinations- Flu and Pneumonia shot- patient declined.   4. Patient will continue regular follow-up with pulmonology- next appointment on 9/26/25.   5. Patient will follow-up with specialty mail out services.  Care Coordinator to set up future refill outreaches, coordinate prescription delivery, and escalate clinical questions to pharmacist.  6. Will follow-up in 6 months, or sooner if needed. Pharmacist to perform regular assessments no more than (6) months from the previous assessment.   7. Welcome information and patient satisfaction survey to be sent by specialty pharmacy team with patient's initial fill.    Tezspire (tezepelumab-ekko)  Medication Expectations   Why am I taking this medication? You are taking Tezspire because you have severe asthma.    What should I expect while on this medication? You should expect a significant improvement in asthma symptom control and quality of life, including reduction in the number of exacerbations and their severity.   How does the medication work? Tezspire works by blocking a protien in your lungs called TSLP. TSLP is released when the airways in your lungs are exposed to asthma triggers, and it causes a chain reaction of inflammation that can lead to the symptoms you experience during asthma attacks.  By targeting TSLP, Tezspire helps to stop this chain reaction before it starts to reduce inflammation and help prevent asthma attacks.    How long will I be on this medication for? The amount of time you will be on this medication will be decided by your provider and how you respond to therapy.    How do I take this medication? This is a subcutaneous injection that can either be administered in your physician's office or for at-home administration.  The pre-filled syringe must be administered in the physician's office.  The pre-filled pen is the only dose that is available for home use, and it is taken every 4 weeks.   If you are giving yourself the injection, the recommended injection site is the front of your thigh or the lower part of your stomach (abdomen). Rotate these injection sites, and do not inject yourself in the arm.  Do not inject into skin that is tender, damaged, bruised, or scarred.    What are some possible side effects? The most common side effects of Tezspire include sore throat, joint pain, injection site reactions, and back pain.   What happens if I miss a dose? If a dose is missed, administer the dose as soon as possible.      Medication Safety   What are things I should warn my doctor immediately about? Hypersensitivity reactions, acute asthma symptoms or worsening disease, if you have a parasitic infection, recently had any live vaccines, or are pregnant or plan to become pregnant, or are breastfeeding.   What are things that I should be cautious of? Injection site reactions or any of the side effects mentioned above.   What are some medications that can interact with this one? No formal drug interactions studies have been performed with Teszpire at this time.      Medication Storage/Handling   How should I handle this medication? Keep this medication our of reach of pets/children in original container. Wash your hands before and after use.    How does this medication need to be stored? Store refrigerated and upright, protect from light, and do not freeze or shake. If necessary, Tezspire may be kept at room temperature  between for a maximum of 30 days.    How should I dispose of this medication? Put your used pre-filled pen and cap in a sharps disposal container right away after use. Put other used supplies in your household trash, but do not throw away the pre-filled pen in your household trash.     If you do not have a FDA-cleared sharps disposal container, you may use a household container that is:   made of a heavy-duty plastic,   can be closed with a tight-fitting puncture-resistant lid   upright and stable during use   leak-resistant, and properly labeled as hazardous waste / sharps      Resources/Support   How can I remind myself to take this medication? You can download a reminder tung on your phone or use a calandar  to help with your injection schedule.    Is financial support available?  [employer] assistance is available through [employer] program. Your pharmacist can talk to you about financial assistance you qualify for.    Which vaccines are recommended for me? Talk to your doctor before you schedule any vaccines, including live vaccines.           Attestation  Therapeutic appropriateness: Appropriate   I attest the patient was actively involved in and has agreed to the above plan of care. If the prescribed therapy is at any point deemed not appropriate based on the current or future assessments, a consultation will be initiated with the patient's specialty care provider to determine the best course of action. The revised plan of therapy will be documented along with any required assessments and/or additional patient education provided.     Jyoti Callaway, PharmD  7/10/2025  14:46 EDT

## 2025-07-10 NOTE — TELEPHONE ENCOUNTER
[facility] received a new referral for patient for management of [place] medication after patient was previously disenrolled from the program after not being able to make successful contact with patient via both telephone and mail communication.     The clinic will attempt to contact patient three times to re-establish care. Attempts will not be made after this and the patient would then need another referral if provider prefers for the clinic to try and re-establish management.     I have called and left voicemail for patient to return call today.     Thank you,    Diya Linares. Tereza, PharmD  07/10/25  10:35 EDT

## 2025-08-04 ENCOUNTER — SPECIALTY PHARMACY (OUTPATIENT)
Dept: PHARMACY | Facility: HOSPITAL | Age: 45
End: 2025-08-04
Payer: COMMERCIAL